# Patient Record
Sex: FEMALE | Race: WHITE | NOT HISPANIC OR LATINO | Employment: FULL TIME | URBAN - NONMETROPOLITAN AREA
[De-identification: names, ages, dates, MRNs, and addresses within clinical notes are randomized per-mention and may not be internally consistent; named-entity substitution may affect disease eponyms.]

---

## 2017-02-10 ENCOUNTER — HISTORY (OUTPATIENT)
Dept: FAMILY MEDICINE | Facility: OTHER | Age: 47
End: 2017-02-10

## 2017-02-10 ENCOUNTER — OFFICE VISIT - GICH (OUTPATIENT)
Dept: FAMILY MEDICINE | Facility: OTHER | Age: 47
End: 2017-02-10

## 2017-02-10 ENCOUNTER — HOSPITAL ENCOUNTER (OUTPATIENT)
Dept: RADIOLOGY | Facility: OTHER | Age: 47
End: 2017-02-10
Attending: FAMILY MEDICINE

## 2017-02-10 DIAGNOSIS — E11.9 TYPE 2 DIABETES MELLITUS WITHOUT COMPLICATIONS (H): ICD-10-CM

## 2017-02-10 DIAGNOSIS — Z23 ENCOUNTER FOR IMMUNIZATION: ICD-10-CM

## 2017-02-10 DIAGNOSIS — E11.65 TYPE 2 DIABETES MELLITUS WITH HYPERGLYCEMIA (H): ICD-10-CM

## 2017-02-10 DIAGNOSIS — N95.1 FEMALE CLIMACTERIC STATE: ICD-10-CM

## 2017-02-10 DIAGNOSIS — E78.00 PURE HYPERCHOLESTEROLEMIA: ICD-10-CM

## 2017-02-10 DIAGNOSIS — Z12.31 ENCOUNTER FOR SCREENING MAMMOGRAM FOR MALIGNANT NEOPLASM OF BREAST: ICD-10-CM

## 2017-02-10 DIAGNOSIS — Z79.4 LONG TERM CURRENT USE OF INSULIN (H): ICD-10-CM

## 2017-02-10 DIAGNOSIS — Z00.00 ENCOUNTER FOR GENERAL ADULT MEDICAL EXAMINATION WITHOUT ABNORMAL FINDINGS: ICD-10-CM

## 2017-02-10 LAB
ALB RAND URINE - HISTORICAL: <5 MG/L
ANION GAP - HISTORICAL: 10 (ref 5–18)
BUN SERPL-MCNC: 11 MG/DL (ref 7–25)
BUN/CREAT RATIO - HISTORICAL: 17
CALCIUM SERPL-MCNC: 9 MG/DL (ref 8.6–10.3)
CHLORIDE SERPLBLD-SCNC: 101 MMOL/L (ref 98–107)
CHOL/HDL RATIO - HISTORICAL: 2.67
CHOLESTEROL TOTAL: 144 MG/DL
CO2 SERPL-SCNC: 26 MMOL/L (ref 21–31)
CREAT SERPL-MCNC: 0.66 MG/DL (ref 0.7–1.3)
CREATININE, URINE - HISTORICAL: 0.58 G/L
ERYTHROCYTE [SEDIMENTATION RATE] IN BLOOD: 9 MM/HR
ESTIMATED AVERAGE GLUCOSE: 214 MG/DL
GFR IF NOT AFRICAN AMERICAN - HISTORICAL: >60 ML/MIN/1.73M2
GLUCOSE SERPL-MCNC: 163 MG/DL (ref 70–105)
HDLC SERPL-MCNC: 54 MG/DL (ref 23–92)
HEMOGLOBIN A1C MONITORING (POCT) - HISTORICAL: 9.1 % (ref 4–6.2)
LDLC SERPL CALC-MCNC: 67 MG/DL
MICROALBUMIN, RAND UR - HISTORICAL: NORMAL MG/G CREAT
NON-HDL CHOLESTEROL - HISTORICAL: 90 MG/DL
PATIENT STATUS - HISTORICAL: NORMAL
POTASSIUM SERPL-SCNC: 3.4 MMOL/L (ref 3.5–5.1)
PROTEIN QUALITATIVE,URINE - HISTORICAL: NEGATIVE MG/DL
SODIUM SERPL-SCNC: 137 MMOL/L (ref 133–143)
TRIGL SERPL-MCNC: 116 MG/DL

## 2017-04-07 ENCOUNTER — COMMUNICATION - GICH (OUTPATIENT)
Dept: FAMILY MEDICINE | Facility: OTHER | Age: 47
End: 2017-04-07

## 2017-04-07 DIAGNOSIS — E11.9 TYPE 2 DIABETES MELLITUS WITHOUT COMPLICATIONS (H): ICD-10-CM

## 2017-04-07 DIAGNOSIS — Z79.4 LONG TERM CURRENT USE OF INSULIN (H): ICD-10-CM

## 2018-01-03 NOTE — PROGRESS NOTES
Patient Information     Patient Name MRN Sex Selena Amaro [id number] Female 1970      Progress Notes by Irma Torres DO at 2/10/2017 11:00 AM     Author:  Irma Torres DO Service:  (none) Author Type:  PHYS- Osteopathic     Filed:  2017  6:17 AM Encounter Date:  2/10/2017 Status:  Signed     :  Irma Torres DO (PHYS- Osteopathic)            ANNUAL PHYSICAL - FEMALE    HPI: Selena Bingham is a 46 y.o. female who presents for a yearly exam.  Concerns include:  1. DM2.  Has not been watching what she is eating. Concerned her A1c is going to be higher than last time.  On metformin 1000mg; Lantus 40-48u daily.  Has gained 5# since last physical.  Monitors feet daily.  Has eye exam after today's appointment.  Insurance requiring change from Lantus to Basaglar.  2. HLP.  On Zocor 20mg daily.  Last cholesterol was one year ago and stable.  3. Perimenopausal symptoms.  Having hot flashes during the day and night sweats frequently.  Also irritable/emotional.  Still using OCPs for contraception.  Discussed stopping, and consider using Mirena if menorrhagia develops again.    No LMP recorded.   Contraception: OCPs; but is stopping them.  Risk for STI?: No  Last pap: 2016, normal  Any hx of abnormal paps:  Prior abnormal ~5-6 years ago; 3 normal since.  Resuming q3 year monitoring.  FH of early CA?: no  Cholesterol/DM concerns/screening: personal history  Tobacco?: no  Calcium intake: no  DEXA: Due @ 65  Last mammo: 2/10/2017   Colonoscopy: Due @ 50  Immunizations: Tdap due (2007); Hep B #3/3 due; Pneumovax-23 due.  Received influenza vaccine for 9740-3765.     Completing eye exam today after this appointment.    Patient Active Problem List       Diagnosis  Date Noted     Hypercholesterolemia  2014     ABNORMAL PAP SMEAR  2011     endometrial cells on otherwise negative pap smear in woman age 40 or over          BREAST MASS, RIGHT       enlarged          DIABETES MELLITUS,  "TYPE II       ANXIETY  03/09/2009       Past Medical History      Diagnosis   Date     Diabetes type 2, controlled (HC)       UTI (urinary tract infection)       UTI 12/8/12      Varicella       Chickenpox        Past Surgical History      Procedure  Laterality Date     No previous surgery         Social History     Social History        Marital status:  Single     Spouse name: N/A     Number of children:  N/A     Years of education:  N/A     Occupational History      Not on file.     Social History Main Topics        Smoking status:  Never Smoker     Smokeless tobacco:  Never Used     Alcohol use  No     Drug use:  No     Sexual activity:  Not Currently     Partners: Male     Birth control/ protection: Pill     Other Topics  Concern     Not on file      Social History Narrative     Works as a  \"\" @ [employer].  She is not .  + Boyfriend.       Family History       Problem   Relation Age of Onset     Diabetes  Mother      well controlled       Hypertension  Mother      Unknown  Father      Good Health  Brother      unsure       Cancer-breast  No Family History        Current Outpatient Prescriptions       Medication  Sig Dispense Refill     blood sugar diagnostic (ACCU-CHEK SIERRA PLUS TEST STRP) strip Test 3 times daily E11.9 300 Strip 0     fluticasone (50 mcg per actuation) nasal solution (FLONASE) Inhale 1 Spray into both nostrils once daily. 1 Bottle 5     insulin glargine (BASAGLAR KWIKPEN) 100 unit/mL (3 mL) pen Inject 48 Units subcutaneous before bedtime. Product desired:BASAGLAR 2 box 11     Insulin Needles, Disposable, (BD INSULIN PEN NEEDLE UF MINI) 31 gauge x 3/16\" Once daily as directed E11.9 100 Each 0     lancets (ACCU-CHEK MULTICLIX LANCET) Test 3 times daily E11.9 300 Each 0     metFORMIN (GLUCOPHAGE) 1,000 mg tablet Take 1 tablet by mouth 2 times daily with meals. 180 tablet 2     multivitamin (MVI) tablet Take 1 tablet by mouth once daily.       simvastatin (ZOCOR) " "20 mg tablet Take 1 tablet by mouth at bedtime. 90 tablet 4     traZODone (DESYREL) 50 mg tablet TAKE 1 TABLET BY MOUTH AT BEDTIME IF NEEDED FOR SLEEP 90 tablet 0     No current facility-administered medications for this visit.      Medications have been reviewed by me and are current to the best of my knowledge and ability.       REVIEW OF SYSTEMS:  Refer to HPI; other systems reviewed and negative.    PHYSICAL EXAM:  Visit Vitals       /80     Pulse 68     Ht 1.619 m (5' 3.75\")     Wt 85.7 kg (189 lb)     BMI 32.7 kg/m2     CONSTITUTIONAL:  Alert, cooperative, NAD.  EYES: No scleral icterus.  PERRLA.  Conjunctiva clear.  ENT/MOUTH: External ears and nose normal.  TMs normal.  Moist mucous membranes. Oropharynx clear.    ENDO: No thyromegaly or thyroid nodules.  LYMPH:  No cervical or supraclavicular LA.    BREASTS: No skin abnormalities, no erythema.  No discrete masses.  No nipple discharge, no axillary, supra- or infraclavicular LA.   CARDIOVASCULAR: Regular, S1, S2.  No S3 or S4.  No murmur/gallop/rub.  No peripheral edema.  RESPIRATORY: CTA bilaterally, no wheezes, rhonchi or rales.  GI: Bowel sounds wnl.  Soft, nontender, nondistended.  No masses or HSM.  No rebound or guarding.  : Vulva: normal, no lesions or discharge  Urethral meatus: normal size and location, no lesions or discharge  Urethra: no tenderness or masses  Bladder: no fullness or tenderness  Vagina: normal appearance, no abnormal discharge, no lesions.  No evidence of cystocele or rectocele.  Cervix: no cervical motion tenderness  Uterus: normal size and position, mobile, non-tender  Adnexa: no palpable masses bilaterally. No cervical motion tenderness.  Pap smear obtained: no; bimanual exam completed only.  MSKEL: Grossly normal ROM.  No clubbing.  INTEGUMENTARY:  Warm, dry.  No rash noted on exposed skin.  NEUROLOGIC: Facies symmetric.  Grossly normal movement and tone.  No tremor.  PSYCHIATRIC: Affect normal.  Speech fluent.      PHQ " Depression Screening 9/17/2015 2/9/2016   Date of PHQ exam (doc flow) 9/17/2015 2/9/2016   1. Lack of interest/pleasure 0 - Not at all 0 - Not at all   2. Feeling down/depressed 0 - Not at all 0 - Not at all   PHQ-2 TOTAL SCORE 0 0   3. Trouble sleeping 1 - Several days 1 - Several days   4. Decreased energy 0 - Not at all 0 - Not at all   5. Appetite change 0 - Not at all 0 - Not at all   6. Feelings of failure 0 - Not at all 0 - Not at all   7. Trouble concentrating 0 - Not at all 0 - Not at all   8. Activity level 0 - Not at all 0 - Not at all   9. Hurting yourself 0 - Not at all 0 - Not at all   PHQ-9 TOTAL SCORE 1 1   PHQ-9 Severity Level none none   Functional Impairment somewhat difficult somewhat difficult       Results for orders placed or performed in visit on 02/10/17      SEDIMENTATION RATE      Result  Value Ref Range    SEDIMENTATION RATE        9 <21 mm/hr   HEMOGLOBIN A1C MONITORING (POCT)      Result  Value Ref Range    HEMOGLOBIN A1C MONITORING (POCT) 9.1 (H) 4.0 - 6.2 %    ESTIMATED AVERAGE GLUCOSE  214 mg/dL   BASIC METABOLIC PANEL      Result  Value Ref Range    SODIUM 137 133 - 143 mmol/L    POTASSIUM 3.4 (L) 3.5 - 5.1 mmol/L    CHLORIDE 101 98 - 107 mmol/L    CO2,TOTAL 26 21 - 31 mmol/L    ANION GAP 10 5 - 18                    GLUCOSE 163 (H) 70 - 105 mg/dL    CALCIUM 9.0 8.6 - 10.3 mg/dL    BUN 11 7 - 25 mg/dL    CREATININE 0.66 (L) 0.70 - 1.30 mg/dL    BUN/CREAT RATIO           17                    GFR if African American >60 >60 ml/min/1.73m2    GFR if not African American >60 >60 ml/min/1.73m2   LIPID PANEL      Result  Value Ref Range    CHOLESTEROL,TOTAL 144 <200 mg/dL    TRIGLYCERIDES 116 <150 mg/dL    HDL CHOLESTEROL 54 23 - 92 mg/dL    NON-HDL CHOLESTEROL 90 <145 mg/dl    CHOL/HDL RATIO            2.67 <4.50                    LDL CHOLESTEROL 67 <100 mg/dL    PATIENT STATUS            NOT GIVEN                   MICROALBUMIN RANDOM URINE      Result  Value Ref Range    ALB RAND  URINE            <5.0 mg/L    CREATININE,URINE          0.58 g/L    MICROALBUMIN,RAND UR       <30.0 mg/g creat    PROTEIN, URINE Negative Negative mg/dL       ASSESSMENT/PLAN:    ICD-10-CM    1. Annual physical exam Z00.00    2. DIABETES MELLITUS, TYPE II E11.9    3. Perimenopausal symptoms N95.1 SEDIMENTATION RATE      SEDIMENTATION RATE   4. Hypercholesterolemia E78.00 simvastatin (ZOCOR) 20 mg tablet   5. Controlled type 2 diabetes mellitus with hyperglycemia, with long-term current use of insulin (HC) E11.65 HEMOGLOBIN A1C MONITORING (POCT)     Z79.4 BASIC METABOLIC PANEL      insulin glargine (BASAGLAR KWIKPEN) 100 unit/mL (3 mL) pen      metFORMIN (GLUCOPHAGE) 1,000 mg tablet      LIPID PANEL      MICROALBUMIN RANDOM URINE      HEMOGLOBIN A1C MONITORING (POCT)      BASIC METABOLIC PANEL      LIPID PANEL      MICROALBUMIN RANDOM URINE   6. Need for hepatitis B vaccination Z23 HEP B VACCINE ADULT IM      NE ADMIN VACC INITIAL   7. Need for pneumococcal vaccine Z23 PNEUMOCOCCAL VACCINE 23-VALENT => 1 YO IM      NE ADMIN EA ADDL VACC   8. Need for Tdap vaccination Z23 TDAP VACCINE IM      NE ADMIN EA ADDL VACC     Relevant cancer screening discussed.    Counseled on healthy diet, Calcium and vitamin D intake, and exercise.    DM, type 2, uncontrolled last visit.  Due for monitoring labs as above.  Will Rx Basaglar in exchange for Lantus.  Encouraged ASA daily, and will get eye exam done later today.  No elevated BP; but will consider Lisinopril if worsens at all.  Will update Pneumococcal and Hep B vaccines as due.    HLP, chronic.  Due for lipids.    Perimenopausal, new: D/C OCPs.  If menorrhagia develops - will consider IUD.  Discussed ASA daily may help with vasomotor symptoms; but will consider Wellbutrin or other SSRI to help.  Follow up in 2-3 months.    TING BARROW, DO

## 2018-01-03 NOTE — PROGRESS NOTES
Patient Information     Patient Name MRN Sex Selena Amaro [id number] Female 1970      Progress Notes by Sherin Quigley R.T. (ARRT) at 2/10/2017 10:07 AM     Author:  Sherin Quigley R.T. (ARRT) Service:  (none) Author Type:  (none)     Filed:  2/10/2017 10:07 AM Date of Service:  2/10/2017 10:07 AM Status:  Signed     :  Sherin Quigley R.T. (RODNEYT) ([facility] - Registered Radiologic Technologist)            Falls Risk Criteria:    Age 65 and older or under age 4        Sensory deficits    Poor vision    Use of ambulatory aides    Impaired judgment    Unable to walk independently    Meets High Risk criteria for falls:  no

## 2018-01-04 NOTE — TELEPHONE ENCOUNTER
Patient Information     Patient Name MRN Sex Selena Amaro [id number] Female 1970      Telephone Encounter by Maryan Lyn RN at 2017 10:05 AM     Author:  Maryan Lyn RN Service:  (none) Author Type:  NURS- Registered Nurse     Filed:  2017 10:16 AM Encounter Date:  2017 Status:  Signed     :  Maryan Lyn RN (NURS- Registered Nurse)            Diabetic Supplies  Office visit in the past 12 months.  Last visit with TING BARROW was on: 02/10/2017 in [facility]  Next visit with TING BARROW is on: No future appointment listed with this provider  Next visit with Family Practice is on: No future appointment listed in this department  Max refill for 12 months from last office visit.  Always add ICD-9 code.  Needs not be listed on Med List to fill.  Need new RX every 12 months or if exceeds the limitations set by Medicare, then every 6 months.  Also when testing frequency is changed is there a need to obtain a new order.  A refill request does not need to be approved by the ordering physician-a beneficiary or their caregiver may request refills.  Physicians are not required to fill out additional forms such as home testing results for suppliers or provide additional documentation unless the supplier is audited and the  is requesting such documentation.    Prescription refilled per RN Medication Refill Policy.................... Maryan Lyn RN ....................  2017   10:06 AM

## 2018-01-27 VITALS
BODY MASS INDEX: 32.27 KG/M2 | HEART RATE: 68 BPM | WEIGHT: 189 LBS | SYSTOLIC BLOOD PRESSURE: 132 MMHG | DIASTOLIC BLOOD PRESSURE: 80 MMHG | HEIGHT: 64 IN

## 2018-02-09 ENCOUNTER — DOCUMENTATION ONLY (OUTPATIENT)
Dept: FAMILY MEDICINE | Facility: OTHER | Age: 48
End: 2018-02-09

## 2018-02-09 PROBLEM — E11.9 DIABETES MELLITUS, TYPE II (H): Status: ACTIVE | Noted: 2018-02-09

## 2018-02-09 PROBLEM — N63.10 BREAST MASS, RIGHT: Status: ACTIVE | Noted: 2018-02-09

## 2018-02-09 RX ORDER — SIMVASTATIN 20 MG
20 TABLET ORAL AT BEDTIME
COMMUNITY
Start: 2017-02-10 | End: 2018-03-21

## 2018-02-09 RX ORDER — FLUTICASONE PROPIONATE 50 MCG
1 SPRAY, SUSPENSION (ML) NASAL DAILY
COMMUNITY
Start: 2013-09-20 | End: 2019-06-20

## 2018-02-09 RX ORDER — DIPHENOXYLATE HYDROCHLORIDE AND ATROPINE SULFATE 2.5; .025 MG/1; MG/1
1 TABLET ORAL DAILY
COMMUNITY

## 2018-02-09 RX ORDER — TRAZODONE HYDROCHLORIDE 50 MG/1
50 TABLET, FILM COATED ORAL AT BEDTIME
COMMUNITY
Start: 2015-01-29 | End: 2019-06-20

## 2018-02-22 NOTE — LETTER
February 26, 2018      Selena ALMARAZ Otilia  [address] [address]        This is to remind you that you are due for your annual labs and an office visit with Irma Torres DO.  Your last visit was on 02/10/2017.     Additional refills of your medication require you to complete this visit.    Please call [phone] to schedule your appointment.    Thank you for choosing [facility] for your health careneeds.    Sincerely,      Refill RN  [facility]

## 2018-03-21 DIAGNOSIS — E78.00 HYPERCHOLESTEREMIA: ICD-10-CM

## 2018-03-21 DIAGNOSIS — E11.65 CONTROLLED TYPE 2 DIABETES MELLITUS WITH HYPERGLYCEMIA, WITHOUT LONG-TERM CURRENT USE OF INSULIN (H): Primary | ICD-10-CM

## 2018-03-27 RX ORDER — INSULIN GLARGINE 100 [IU]/ML
INJECTION, SOLUTION SUBCUTANEOUS
Qty: 30 ML | Refills: 0 | Status: SHIPPED | OUTPATIENT
Start: 2018-03-27 | End: 2018-04-11

## 2018-03-27 RX ORDER — SIMVASTATIN 20 MG
TABLET ORAL
Qty: 90 TABLET | Refills: 0 | Status: SHIPPED | OUTPATIENT
Start: 2018-03-27 | End: 2018-07-12

## 2018-04-11 ENCOUNTER — OFFICE VISIT (OUTPATIENT)
Dept: FAMILY MEDICINE | Facility: OTHER | Age: 48
End: 2018-04-11
Attending: FAMILY MEDICINE
Payer: COMMERCIAL

## 2018-04-11 ENCOUNTER — HOSPITAL ENCOUNTER (OUTPATIENT)
Dept: MAMMOGRAPHY | Facility: OTHER | Age: 48
Discharge: HOME | End: 2018-04-11
Attending: FAMILY MEDICINE | Admitting: FAMILY MEDICINE
Payer: COMMERCIAL

## 2018-04-11 VITALS
DIASTOLIC BLOOD PRESSURE: 80 MMHG | SYSTOLIC BLOOD PRESSURE: 122 MMHG | WEIGHT: 186.8 LBS | HEART RATE: 72 BPM | BODY MASS INDEX: 31.89 KG/M2 | HEIGHT: 64 IN

## 2018-04-11 DIAGNOSIS — E78.00 HYPERCHOLESTEROLEMIA: ICD-10-CM

## 2018-04-11 DIAGNOSIS — R11.0 NAUSEA: ICD-10-CM

## 2018-04-11 DIAGNOSIS — Z12.31 VISIT FOR SCREENING MAMMOGRAM: ICD-10-CM

## 2018-04-11 DIAGNOSIS — Z79.4 UNCONTROLLED TYPE 2 DIABETES MELLITUS WITH HYPERGLYCEMIA, WITH LONG-TERM CURRENT USE OF INSULIN (H): ICD-10-CM

## 2018-04-11 DIAGNOSIS — E11.65 UNCONTROLLED TYPE 2 DIABETES MELLITUS WITH HYPERGLYCEMIA, WITH LONG-TERM CURRENT USE OF INSULIN (H): ICD-10-CM

## 2018-04-11 DIAGNOSIS — F41.1 ANXIETY STATE: ICD-10-CM

## 2018-04-11 DIAGNOSIS — Z00.00 ROUTINE HISTORY AND PHYSICAL EXAMINATION OF ADULT: Primary | ICD-10-CM

## 2018-04-11 LAB
ANION GAP SERPL CALCULATED.3IONS-SCNC: 10 MMOL/L (ref 3–14)
BUN SERPL-MCNC: 19 MG/DL (ref 7–25)
CALCIUM SERPL-MCNC: 10.3 MG/DL (ref 8.6–10.3)
CHLORIDE SERPL-SCNC: 100 MMOL/L (ref 98–107)
CHOLEST SERPL-MCNC: 135 MG/DL
CO2 SERPL-SCNC: 30 MMOL/L (ref 21–31)
CREAT SERPL-MCNC: 0.75 MG/DL (ref 0.6–1.2)
CREAT UR-MCNC: 110 MG/DL
GFR SERPL CREATININE-BSD FRML MDRD: 83 ML/MIN/1.7M2
GLUCOSE SERPL-MCNC: 208 MG/DL (ref 70–105)
HBA1C MFR BLD: 9.1 % (ref 4–6)
HDLC SERPL-MCNC: 63 MG/DL (ref 23–92)
LDLC SERPL CALC-MCNC: 56 MG/DL
MICROALBUMIN UR-MCNC: 6 MG/L
MICROALBUMIN/CREAT UR: 5.72 MG/G CR (ref 0–25)
NONHDLC SERPL-MCNC: 72 MG/DL
POTASSIUM SERPL-SCNC: 3.8 MMOL/L (ref 3.5–5.1)
SODIUM SERPL-SCNC: 140 MMOL/L (ref 134–144)
TRIGL SERPL-MCNC: 82 MG/DL

## 2018-04-11 PROCEDURE — 80048 BASIC METABOLIC PNL TOTAL CA: CPT | Performed by: FAMILY MEDICINE

## 2018-04-11 PROCEDURE — 80061 LIPID PANEL: CPT | Performed by: FAMILY MEDICINE

## 2018-04-11 PROCEDURE — 82043 UR ALBUMIN QUANTITATIVE: CPT | Performed by: FAMILY MEDICINE

## 2018-04-11 PROCEDURE — 99396 PREV VISIT EST AGE 40-64: CPT | Performed by: FAMILY MEDICINE

## 2018-04-11 PROCEDURE — 36415 COLL VENOUS BLD VENIPUNCTURE: CPT | Performed by: FAMILY MEDICINE

## 2018-04-11 PROCEDURE — 83036 HEMOGLOBIN GLYCOSYLATED A1C: CPT | Performed by: FAMILY MEDICINE

## 2018-04-11 PROCEDURE — 77067 SCR MAMMO BI INCL CAD: CPT

## 2018-04-11 RX ORDER — INSULIN GLARGINE 100 [IU]/ML
60 INJECTION, SOLUTION SUBCUTANEOUS AT BEDTIME
Qty: 63 ML | Refills: 4 | Status: SHIPPED | OUTPATIENT
Start: 2018-04-11 | End: 2019-06-20

## 2018-04-11 RX ORDER — GLIMEPIRIDE 4 MG/1
4 TABLET ORAL
Qty: 90 TABLET | Refills: 4 | Status: SHIPPED | OUTPATIENT
Start: 2018-04-11 | End: 2018-05-01

## 2018-04-11 NOTE — MR AVS SNAPSHOT
"              After Visit Summary   4/11/2018    Selena Bingham    MRN: [id number]           Patient Information     Date Of Birth          1970        Visit Information        Provider Department      4/11/2018 10:00 AM Irma Torres DO [facility]        Today's Diagnoses     Routine history and physical examination of adult    -  1    Type 2 diabetes mellitus without complication, with long-term current use of insulin (H)        Hypercholesterolemia        Anxiety state        Controlled type 2 diabetes mellitus with hyperglycemia, without long-term current use of insulin (H)           Follow-ups after your visit        Who to contact     If you have questions or need follow up information about today's clinic visit or your schedule please contact [facility] directly at [phone].  Normal or non-critical lab and imaging results will be communicated to you by [employer], letter or phone within 4 business days after the clinic has received the results. If you do not hear from us within 7 days, please contact the clinic through [employer] or phone. If you have a critical or abnormal lab result, we will notify you by phone as soon as possible.  Submit refill requests through [employer] or call your pharmacy and they will forward the refill request to us. Please allow 3 business days for your refill to be completed.          Additional Information About Your Visit        [employer] Information     [employer] lets you send messages to your doctor, view your test results, renew your prescriptions, schedule appointments and more. To sign up, go to [email or web address] . Click on \"Log in\" on the left side of the screen, which will take you to the Welcome page. Then click on \"Sign up Now\" on the right side of the page.     You will be asked to enter the access code listed below, as well as some personal information. Please follow the directions to create your username and " "password.     Your access code is: [email or web address]  Expires: 7/10/2018 10:27 AM     Your access code will  in 90 days. If you need help or a new code, please call your [place] clinic or [phone].        Care EveryWhere ID     This is your Care EveryWhere ID. This could be used by other organizations to access your [place] medical records  [id number]        Your Vitals Were     Pulse Height Last Period BMI (Body Mass Index)          72 5' 3.5\" (1.613 m) 2018 32.57 kg/m2         Blood Pressure from Last 3 Encounters:   18 122/80   02/10/17 132/80   16 122/88    Weight from Last 3 Encounters:   18 186 lb 12.8 oz (84.7 kg)   02/10/17 189 lb (85.7 kg)   16 184 lb 6.4 oz (83.6 kg)              We Performed the Following     Albumin Random Urine Quantitative with Creat Ratio     Basic metabolic panel  (Ca, Cl, CO2, Creat, Gluc, K, Na, BUN)     Hemoglobin A1c     Lipid Profile (Chol, Trig, HDL, LDL calc) - FASTING          Today's Medication Changes          These changes are accurate as of 18 10:27 AM.  If you have any questions, ask your nurse or doctor.               These medicines have changed or have updated prescriptions.        Dose/Directions    BASAGLAR 100 UNIT/ML injection   This may have changed:  See the new instructions.   Used for:  Controlled type 2 diabetes mellitus with hyperglycemia, without long-term current use of insulin (H)   Changed by:  Irma Torres DO        Dose:  60 Units   Inject 60 Units Subcutaneous At Bedtime   Quantity:  63 mL   Refills:  4            Where to get your medicines      These medications were sent to [id number] - [place], [place] - [address], [facility] [address]     Phone:  [phone]     BASAGLAR 100 UNIT/ML injection                Primary Care Provider Office Phone # Fax #    Irma Torres [phone] [address]  [place] [address]        Equal Access to Services     [facility] HospitalDANIELLA : Hadii aad jeff taylor Luciano, wasereneda luqadaha, qarhiannonta kaalmaenrique rodriguez, radha higginsdorotajordan hoffman. So [facility] [phone].    ATENCIÓN: Si habla español, tiene a gilmore disposición servicios gratuitos de asistencia lingüística. Llame al [phone].    We comply with applicable federal civil rights laws and [place] laws. We do not discriminate on the basis of race, color, national origin, age, disability, sex, sexual orientation, or gender identity.            Thank you!     Thank you for choosing [facility]  for your care. Our goal is always to provide you with excellent care. Hearing back from our patients is one way we can continue to improve our services. Please take a few minutes to complete the written survey that you may receive in the mail after your visit with us. Thank you!             Your Updated Medication List - Protect others around you: Learn how to safely use, store and throw away your medicines at www.disposemymeds.org.          This list is accurate as of 4/11/18 10:27 AM.  Always use your most recent med list.                   Brand Name Dispense Instructions for use Diagnosis    ACCU-CHEK ACTIVE STRIPS test strip   Generic drug:  blood glucose monitoring      3 times daily        ASPIRIN LOW DOSE 81 MG tablet   Generic drug:  aspirin     30 tablet    Take by mouth daily        BASAGLAR 100 UNIT/ML injection     63 mL    Inject 60 Units Subcutaneous At Bedtime    Controlled type 2 diabetes mellitus with hyperglycemia, without long-term current use of insulin (H)       blood glucose monitoring lancets      Dispense item per insurance coverage E11.9 IDDM Type 2- Test 3 times a day        FIFTY50 PEN NEEDLES 31G X 5 MM   Generic drug:  insulin pen needle      Dispense item per insurance coverage- Use as directed for administering insulin at home. E11.9 IDDM Type 2        fluticasone 50 MCG/ACT  spray    FLONASE     Spray 1 spray in nostril daily        metFORMIN 1000 MG tablet    GLUCOPHAGE    180 tablet    TAKE 1 TABLET BY MOUTH TWICE DAILY WITH MEALS    Type 2 diabetes mellitus, uncontrolled (H)       MULTI-VITAMINS Tabs      Take 1 tablet by mouth daily        simvastatin 20 MG tablet    ZOCOR    90 tablet    TAKE 1 TABLET BY MOUTH AT BEDTIME    Hypercholesteremia       traZODone 50 MG tablet    DESYREL     Take 50 mg by mouth At Bedtime

## 2018-04-11 NOTE — NURSING NOTE
Previous A1C is not at goal of <8  No components found for: HGBA1C  Urine microalbumin:creatine: <30.0  Foot exam unknown  Eye exam: today    Tobacco User no  Patient is on a daily aspirin  Patient is on a Statin.  Blood pressure today of 122/80 is at the goal of <139/89 for diabetics.    Татьяна Mchugh LPN..............4/11/2018 11:20 AM

## 2018-04-11 NOTE — PROGRESS NOTES
Nursing Notes:   Татьяна Mchugh LPN  4/11/2018  9:56 AM  Signed  Patient here for physical. No concerns.  Татьяна REINA Severino............................... 4/11/2018 9:53 AM      ANNUAL PHYSICAL - FEMALE    HPI: Selena presents for a yearly exam.  Concerns include:  1. DM.  Most blood sugars are mid-100s.  Rare over 200 per patient report.  No log available for review.  Has previously had uncontrolled Hgb A1c levels.  Currently on metformin 1000mg BID and Basaglar insulin 60 units at bedtime.  2. Did have an episode in Jan where she got up during the night to use the bathroom, and when she went from laying to standing, she became severely sweaty, nauseous.  She proceeded to the bathroom, and when she was done, proceeded to stand and have it occur again.  No further episodes.  No changes with increased exertion.  Denies current CP, SOB, N/V, vision changes, headaches.    Patient's last menstrual period was 04/01/2018.   Contraception: none; aware of risk of pregnancy.  Risk for STI?: No  Last pap: 2/9/2016; negative.   Any hx of abnormal paps:  LSIL in 6/2009 with multiple negative testing since.  FH ofearly CA?: No  Cholesterol/DM concerns/screening: Due  Tobacco?: No  Calcium intake: multivitamin and dietary; no extra Ca++.  DEXA: @ 65  Last mammo: 4/11/2018; results pending.  Colonoscopy: @ 50.  Immunizations: Tdap 2/10/2017; no flu vaccine this season; shingles @ 50.  Recommended to get PSV 23 today due to DM diagnosis.    Patient Active Problem List   Diagnosis     Other abnormal Papanicolaou smear of cervix and cervical HPV(795.09)     Anxiety state     Breast mass, right     Diabetes mellitus, type II (H)     Hypercholesterolemia     Past Medical History:   Diagnosis Date     Type 2 diabetes mellitus without complications (H)     No Comments Provided     Urinary tract infection     UTI 12/8/12     Varicella without complication     Chickenpox     Past Surgical History:   Procedure Laterality Date      "OTHER SURGICAL HISTORY      [id number],NO PREVIOUS SURGERY     Family History   Problem Relation Age of Onset     DIABETES Mother      Diabetes,well controlled     Hypertension Mother      Hypertension     Unknown/Adopted Father      Unknown     Family History Negative Brother      Good Health,unsure     Breast Cancer No family hx of      Cancer-breast     Social History     Social History     Marital status: Single     Spouse name: N/A     Number of children: N/A     Years of education: N/A     Social History Main Topics     Smoking status: Never Smoker     Smokeless tobacco: Never Used     Alcohol use No     Drug use: No      Comment: Drug use: No     Sexual activity: Not Currently     Partners: Male     Birth control/ protection: Pill     Other Topics Concern     None     Social History Narrative    Works as a  \"\" @ [employer].  She is not .  + Boyfriend.       Current Outpatient Prescriptions   Medication Sig Dispense Refill     aspirin (ASPIRIN LOW DOSE) 81 MG tablet Take by mouth daily 30 tablet      BASAGLAR 100 UNIT/ML injection Inject 60 Units Subcutaneous At Bedtime 63 mL 4     simvastatin (ZOCOR) 20 MG tablet TAKE 1 TABLET BY MOUTH AT BEDTIME 90 tablet 0     [DISCONTINUED] BASAGLAR 100 UNIT/ML injection INJECT 48 UNITS UNDER THE SKIN BEFORE BEDTIME 30 mL 0     metFORMIN (GLUCOPHAGE) 1000 MG tablet TAKE 1 TABLET BY MOUTH TWICE DAILY WITH MEALS 180 tablet 0     blood glucose monitoring (ACCU-CHEK ACTIVE STRIPS) test strip 3 times daily       fluticasone (FLONASE) 50 MCG/ACT spray Spray 1 spray in nostril daily       insulin pen needle (FIFTY50 PEN NEEDLES) 31G X 5 MM Dispense item per insurance coverage- Use as directed for administering insulin at home. E11.9 IDDM Type 2       blood glucose monitoring (ACCU-CHEK MULTICLIX) lancets Dispense item per insurance coverage E11.9 IDDM Type 2- Test 3 times a day       Multiple Vitamin (MULTI-VITAMINS) TABS Take 1 tablet by mouth daily   " "    traZODone (DESYREL) 50 MG tablet Take 50 mg by mouth At Bedtime          REVIEW OF SYSTEMS:  Refer to HPI; all other systems reviewed and negative.    PHYSICAL EXAM:  /80 (BP Location: Right arm, Patient Position: Sitting, Cuff Size: Adult Regular)  Pulse 72  Ht 5' 3.5\" (1.613 m)  Wt 186 lb 12.8 oz (84.7 kg)  LMP 04/01/2018  BMI 32.57 kg/m2  CONSTITUTIONAL:  Alert, cooperative, NAD.  EYES: No scleral icterus.  PERRLA.  Conjunctiva clear.  ENT/MOUTH: External ears and nosenormal.  TMs normal.  Moist mucous membranes. Oropharynx clear.    ENDO: No thyromegaly or thyroid nodules.  LYMPH:  No cervical or supraclavicular LA.    BREASTS: Deferred  CARDIOVASCULAR: Regular, S1, S2.  No S3 or S4.  No murmur/gallop/rub.  No peripheral edema.  RESPIRATORY: CTA bilaterally, no wheezes, rhonchior rales.  GI: Bowel sounds wnl.  Soft, nontender, nondistended.  No masses or HSM.  No rebound or guarding.  : Deferred  Pap smear obtained: No  MSKEL: Grossly normal ROM.  No clubbing.  INTEGUMENTARY:Warm, dry.  No rash noted on exposed skin.  Foot exam: normal b/l.  Sensation intact.  Small dryness/mild callous at heel and 5th toes plantar aspect.  NEUROLOGIC: Facies symmetric.  Grossly normal movement and tone.  No tremor.  PSYCHIATRIC: Affect normal.  Speech fluent.      PHQ-9 SCORE 9/17/2015 2/9/2016   Total Score 1 1       Results for orders placed or performed in visit on 04/11/18   Basic metabolic panel  (Ca, Cl, CO2, Creat, Gluc, K, Na, BUN)   Result Value Ref Range    Sodium 140 134 - 144 mmol/L    Potassium 3.8 3.5 - 5.1 mmol/L    Chloride 100 98 - 107 mmol/L    Carbon Dioxide 30 21 - 31 mmol/L    Anion Gap 10 3 - 14 mmol/L    Glucose 208 (H) 70 - 105 mg/dL    Urea Nitrogen 19 7 - 25 mg/dL    Creatinine 0.75 0.60 - 1.20 mg/dL    GFR Estimate 83 >60 mL/min/1.7m2    GFR Estimate If Black >90 >60 mL/min/1.7m2    Calcium 10.3 8.6 - 10.3 mg/dL   Hemoglobin A1c   Result Value Ref Range    Hemoglobin A1C 9.1 (H) 4.0 - " 6.0 %   Lipid Profile (Chol, Trig, HDL, LDL calc) - FASTING   Result Value Ref Range    Cholesterol 135 <200 mg/dL    Triglycerides 82 <150 mg/dL    HDL Cholesterol 63 23 - 92 mg/dL    LDL Cholesterol Calculated 56 <100 mg/dL    Non HDL Cholesterol 72 <130 mg/dL       ASSESSMENT/PLAN:  1. Type 2 diabetes mellitus without complication, with long-term current use of insulin (H)  Uncontrolled.  Will need addition of another medication due to elevated Hgb A1c.  Insulin vs sulfonylurea vs other oral agent.  Will start glimepiride daily x 3 months.  If Hgb A1c remains elevated, will continue to discuss multi-dose insulin.  - Basic metabolic panel  (Ca, Cl, CO2, Creat, Gluc, K, Na, BUN)  - Hemoglobin A1c  - Albumin Random Urine Quantitative with Creat Ratio    2. Hypercholesterolemia  Improved with Zocor; continue to monitor for lipid screening yearly.  - Lipid Profile (Chol, Trig, HDL, LDL calc) - FASTING    3. Anxiety state  Chronic, stable. No meidcation indicated at this time.  Encouraged healthy activity, diet and improving blood sugar levels.    4. Routine history and physical examination of adult    5.  Nausea  Possible episode of orthostatic hypotension due to history.  No physical findings or concerns on today's exam.  Discussed if recurs or symptoms worsen, consider stress testing or further work up.    Relevant cancer screening discussed.    Counseled on healthy diet, Calcium and vitamin D intake, and exercise.    Irma Torres

## 2018-04-11 NOTE — NURSING NOTE
Patient here for physical. No concerns.  Татьяна Severino............................... 4/11/2018 9:53 AM

## 2018-04-23 ENCOUNTER — OFFICE VISIT (OUTPATIENT)
Dept: FAMILY MEDICINE | Facility: OTHER | Age: 48
End: 2018-04-23
Attending: NURSE PRACTITIONER
Payer: COMMERCIAL

## 2018-04-23 VITALS
WEIGHT: 183.38 LBS | DIASTOLIC BLOOD PRESSURE: 82 MMHG | TEMPERATURE: 97.3 F | SYSTOLIC BLOOD PRESSURE: 124 MMHG | HEART RATE: 80 BPM | BODY MASS INDEX: 31.97 KG/M2

## 2018-04-23 DIAGNOSIS — B08.4 HAND, FOOT AND MOUTH DISEASE: Primary | ICD-10-CM

## 2018-04-23 PROCEDURE — 99213 OFFICE O/P EST LOW 20 MIN: CPT | Performed by: NURSE PRACTITIONER

## 2018-04-23 ASSESSMENT — PAIN SCALES - GENERAL: PAINLEVEL: NO PAIN (0)

## 2018-04-23 NOTE — NURSING NOTE
Patient presents to clinic today for rash on palm of hands. She states it started about two days ago. She states it is not itchy, but some of the spots are painful.    Cecilia Guadarrama LPN...................4/23/2018  3:45 PM

## 2018-04-23 NOTE — PROGRESS NOTES
HPI:    Selena Bingham is a 47 year old female who presents to clinic today for rash. She has rash on palms of hands, present for about 2 days. Denies this being itchy but has some pain to some of the spots. Started on right hand with a couple spots. She reports this has spread to both hands. Has 1 lesion on her right heel. Feels her feet are pins and needles feeling. Rash is not on anywhere else on the body. Thinks maybe 1 lesion in her mouth. No known ill contacts. She does work in retail.     Past Medical History:   Diagnosis Date     Type 2 diabetes mellitus without complications (H)     No Comments Provided     Urinary tract infection     UTI 12/8/12     Varicella without complication     Chickenpox       Past Surgical History:   Procedure Laterality Date     OTHER SURGICAL HISTORY      [id number],NO PREVIOUS SURGERY       Current Outpatient Prescriptions   Medication Sig Dispense Refill     aspirin (ASPIRIN LOW DOSE) 81 MG tablet Take by mouth daily 30 tablet      BASAGLAR 100 UNIT/ML injection Inject 60 Units Subcutaneous At Bedtime 63 mL 4     blood glucose monitoring (ACCU-CHEK ACTIVE STRIPS) test strip 3 times daily       blood glucose monitoring (ACCU-CHEK MULTICLIX) lancets Dispense item per insurance coverage E11.9 IDDM Type 2- Test 3 times a day       fluticasone (FLONASE) 50 MCG/ACT spray Spray 1 spray in nostril daily       glimepiride (AMARYL) 4 MG tablet Take 1 tablet (4 mg) by mouth every morning (before breakfast) 90 tablet 4     insulin pen needle (FIFTY50 PEN NEEDLES) 31G X 5 MM Dispense item per insurance coverage- Use as directed for administering insulin at home. E11.9 IDDM Type 2       metFORMIN (GLUCOPHAGE) 1000 MG tablet TAKE 1 TABLET BY MOUTH TWICE DAILY WITH MEALS 180 tablet 0     Multiple Vitamin (MULTI-VITAMINS) TABS Take 1 tablet by mouth daily       simvastatin (ZOCOR) 20 MG tablet TAKE 1 TABLET BY MOUTH AT BEDTIME 90 tablet 0     traZODone (DESYREL) 50 MG tablet Take 50 mg by mouth At  Bedtime         No Known Allergies    ROS:  Pertinent positives and negatives are noted in HPI.    EXAM:  General appearance: well appearing female, in no acute distress  Head: normocephalic, atraumatic  Ears: TM's with cone of light, no erythema, canals clear bilaterally  Eyes: conjunctivae normal  Orophayrnx: moist mucous membranes, 3 small red dots on top of soft palette   Neck: supple without adenopathy  Respiratory: clear to auscultation bilaterally  Cardiac: RRR with no murmurs  Dermatological: palms of hands and soles of feet with several red macular lesions  Psychological: normal affect, alert and pleasant      ASSESSMENT AND PLAN:    1. Hand, foot and mouth disease      Sx and exam consistent with hand, foot and mouth. Discussed sx management, s/s that would warrant f/u and when she should f/u. All questions were answered and she is in agreement with plan.         Stacie Siu..................4/23/2018 3:42 PM

## 2018-04-23 NOTE — PATIENT INSTRUCTIONS
Hand, Foot, and Mouth Disease (Child)    Hand, foot, and mouth disease (HFMD) is an illness caused by a virus. It is usually seen in young children. This virus causes small ulcers in the mouth (throat, lips, cheeks, gums, and tongue) and small blisters or red spots may appear on the palms (hands), diaper area, and soles of the feet. There is usually a low-grade fever and poor appetite. HFMD is not a serious illness and usually go away in 1 to 2 weeks. The painful sores in the mouth may prevent your child from eating and drinking.  It takes 3 to 5 days for the illness to appear in an exposed child. Generally, the HFMD is the most contagious during the first week of the illness. Sometimes, people can be contagious for days or weeks after the symptoms have disappeared.  HFMD can be transmitted from person to person by:    Touching your nose, mouth, eye after touching the stool of an infected person (has the virus)    Touching your nose, mouth, eye after touching fluid from the blisters/sores of an infected person    Respiratory secretions (sneezing, coughing, blowing your nose)    Touching contaminated objects (toys, doorknobs)    Oral secretions (kissing)  Home care  Mouth pain  Unless your healthcare provider has prescribed another medicine for mouth pain:    Acetaminophen or ibuprofen may be used for pain or discomfort or fever. Please consult your child's healthcare provider before giving your child acetaminophen or ibuprofen for dosing instructions and when to give the medicine (schedule).  Do not give ibuprofen to an infant 6 months of age or younger. If your child has chronic liver or kidney disease or ever had a stomach ulcer or gastrointestinal bleeding, talk with your healthcare provider before using these medicines. Never give aspirin to anyone under 18 years of age who has a fever. It may cause severe disease (Reye Syndrome) or death. Talk to your child's healthcare provider before giving him or her  over-the counter medicines.    Liquid rinses may be used in children over 12 months of age. Ask your child's healthcare provider for instructions.  Feeding  Follow a soft diet with plenty of fluids to prevent dehydration. If your child doesn't want to eat solid foods, it's OK for a few days, as long as he or she drinks lots of fluid. Cool drinks and frozen treats (sherbet) are soothing and easier to take. Avoid citrus juices (orange juice, lemonade, etc.) and salty or spicy foods. These may cause more pain in the mouth sores.  Return to  or school  Children may usually return to day care or school once the fever is gone and they are eating and drinking well. Contact your healthcare provider and ask when your child is able to return to  or school.  Follow up  Follow up with your child's healthcare provider, or as advised.  When to seek medical advice  Call your child's healthcare provider right away if any of these occur:    Your child complains of pain in the back of the neck    Your child has a severe headache or continued vomiting    Your child is having trouble breathing    Your child is drowsy or has trouble staying awake    Your child is having trouble swallowing    Mouth ulcers are present after 2 weeks    Your child's symptoms are getting worse    Your child appears to be dehydrated (dry mouth, no tears, haven' t urinated is 8 or more hours)    Your child has a fever (see Fever and children, below)  Call 911  Call 911 if any of these occur:    Unusual fussiness, drowsiness, or confusion    Severe headache or vomiting that continues    Trouble breathing    Seizures  Fever and children  Always use a digital thermometer to check your child s temperature. Never use a mercury thermometer.  For infants and toddlers, be sure to use a rectal thermometer correctly. A rectal thermometer may accidentally poke a hole in (perforate) the rectum. It may also pass on germs from the stool. Always follow the  product maker s directions for proper use. If you don t feel comfortable taking a rectal temperature, use another method. When you talk to your child s healthcare provider, tell him or her which method you used to take your child s temperature.  Here are guidelines for fever temperature. Ear temperatures aren t accurate before 6 months of age. Don t take an oral temperature until your child is at least 4 years old.  Infant under 3 months old:    Ask your child s healthcare provider how you should take the temperature.    Rectal or forehead (temporal artery) temperature of 100.4 F (38 C) or higher, or as directed by the provider    Armpit temperature of 99 F (37.2 C) or higher, or as directed by the provider  Child age 3 to 36 months:    Rectal, forehead (temporal artery), or ear temperature of 102 F (38.9 C) or higher, or as directed by the provider    Armpit temperature of 101 F (38.3 C) or higher, or as directed by the provider  Child of any age:    Repeated temperature of 104 F (40 C) or higher, or as directed by the provider    Fever that lasts more than 24 hours in a child under 2 years old. Or a fever that lasts for 3 days in a child 2 years or older.   Date Last Reviewed: 11/1/2017 2000-2017 [employer]. [address] [place] [address], [place] [address]. All rights reserved. This information is not intended as a substitute for professional medical care. Always follow your healthcare professional's instructions.

## 2018-04-23 NOTE — MR AVS SNAPSHOT
After Visit Summary   4/23/2018    Selena Bingham    MRN: [id number]           Patient Information     Date Of Birth          1970        Visit Information        Provider Department      4/23/2018 3:45 PM Stacie Siu APRN CNP [facility]        Today's Diagnoses     Hand, foot and mouth disease    -  1      Care Instructions      Hand, Foot, and Mouth Disease (Child)    Hand, foot, and mouth disease (HFMD) is an illness caused by a virus. It is usually seen in young children. This virus causes small ulcers in the mouth (throat, lips, cheeks, gums, and tongue) and small blisters or red spots may appear on the palms (hands), diaper area, and soles of the feet. There is usually a low-grade fever and poor appetite. HFMD is not a serious illness and usually go away in 1 to 2 weeks. The painful sores in the mouth may prevent your child from eating and drinking.  It takes 3 to 5 days for the illness to appear in an exposed child. Generally, the HFMD is the most contagious during the first week of the illness. Sometimes, people can be contagious for days or weeks after the symptoms have disappeared.  HFMD can be transmitted from person to person by:    Touching your nose, mouth, eye after touching the stool of an infected person (has the virus)    Touching your nose, mouth, eye after touching fluid from the blisters/sores of an infected person    Respiratory secretions (sneezing, coughing, blowing your nose)    Touching contaminated objects (toys, doorknobs)    Oral secretions (kissing)  Home care  Mouth pain  Unless your healthcare provider has prescribed another medicine for mouth pain:    Acetaminophen or ibuprofen may be used for pain or discomfort or fever. Please consult your child's healthcare provider before giving your child acetaminophen or ibuprofen for dosing instructions and when to give the medicine (schedule).  Do not give ibuprofen to an infant 6 months of age  or younger. If your child has chronic liver or kidney disease or ever had a stomach ulcer or gastrointestinal bleeding, talk with your healthcare provider before using these medicines. Never give aspirin to anyone under 18 years of age who has a fever. It may cause severe disease (Reye Syndrome) or death. Talk to your child's healthcare provider before giving him or her over-the counter medicines.    Liquid rinses may be used in children over 12 months of age. Ask your child's healthcare provider for instructions.  Feeding  Follow a soft diet with plenty of fluids to prevent dehydration. If your child doesn't want to eat solid foods, it's OK for a few days, as long as he or she drinks lots of fluid. Cool drinks and frozen treats (sherbet) are soothing and easier to take. Avoid citrus juices (orange juice, lemonade, etc.) and salty or spicy foods. These may cause more pain in the mouth sores.  Return to  or school  Children may usually return to day care or school once the fever is gone and they are eating and drinking well. Contact your healthcare provider and ask when your child is able to return to  or school.  Follow up  Follow up with your child's healthcare provider, or as advised.  When to seek medical advice  Call your child's healthcare provider right away if any of these occur:    Your child complains of pain in the back of the neck    Your child has a severe headache or continued vomiting    Your child is having trouble breathing    Your child is drowsy or has trouble staying awake    Your child is having trouble swallowing    Mouth ulcers are present after 2 weeks    Your child's symptoms are getting worse    Your child appears to be dehydrated (dry mouth, no tears, haven' t urinated is 8 or more hours)    Your child has a fever (see Fever and children, below)  Call 911  Call 911 if any of these occur:    Unusual fussiness, drowsiness, or confusion    Severe headache or vomiting that  continues    Trouble breathing    Seizures  Fever and children  Always use a digital thermometer to check your child s temperature. Never use a mercury thermometer.  For infants and toddlers, be sure to use a rectal thermometer correctly. A rectal thermometer may accidentally poke a hole in (perforate) the rectum. It may also pass on germs from the stool. Always follow the product maker s directions for proper use. If you don t feel comfortable taking a rectal temperature, use another method. When you talk to your child s healthcare provider, tell him or her which method you used to take your child s temperature.  Here are guidelines for fever temperature. Ear temperatures aren t accurate before 6 months of age. Don t take an oral temperature until your child is at least 4 years old.  Infant under 3 months old:    Ask your child s healthcare provider how you should take the temperature.    Rectal or forehead (temporal artery) temperature of 100.4 F (38 C) or higher, or as directed by the provider    Armpit temperature of 99 F (37.2 C) or higher, or as directed by the provider  Child age 3 to 36 months:    Rectal, forehead (temporal artery), or ear temperature of 102 F (38.9 C) or higher, or as directed by the provider    Armpit temperature of 101 F (38.3 C) or higher, or as directed by the provider  Child of any age:    Repeated temperature of 104 F (40 C) or higher, or as directed by the provider    Fever that lasts more than 24 hours in a child under 2 years old. Or a fever that lasts for 3 days in a child 2 years or older.   Date Last Reviewed: 11/1/2017 2000-2017 [employer]. [address] [place] [address], [place] [address]. All rights reserved. This information is not intended as a substitute for professional medical care. Always follow your healthcare professional's instructions.                Follow-ups after your visit        Who to contact     If you have questions or need follow up information  about today's clinic visit or your schedule please contact [facility] directly at [phone].  Normal or non-critical lab and imaging results will be communicated to you by MyChart, letter or phone within 4 business days after the clinic has received the results. If you do not hear from us within 7 days, please contact the clinic through [employer] or phone. If you have a critical or abnormal lab result, we will notify you by phone as soon as possible.  Submit refill requests through [employer] or call your pharmacy and they will forward the refill request to us. Please allow 3 business days for your refill to be completed.          Additional Information About Your Visit        [employer] Score Information     [employer] gives you secure access to your electronic health record. If you see a primary care provider, you can also send messages to your care team and make appointments. If you have questions, please call your primary care clinic.  If you do not have a primary care provider, please call [phone] and they will assist you.        Care EveryWhere ID     This is your Care EveryWhere ID. This could be used by other organizations to access your [place] medical records  [id number]        Your Vitals Were     Pulse Temperature Last Period BMI (Body Mass Index)          80 97.3  F (36.3  C) (Temporal) 04/01/2018 31.97 kg/m2         Blood Pressure from Last 3 Encounters:   04/23/18 124/82   04/11/18 122/80   02/10/17 132/80    Weight from Last 3 Encounters:   04/23/18 183 lb 6 oz (83.2 kg)   04/11/18 186 lb 12.8 oz (84.7 kg)   02/10/17 189 lb (85.7 kg)              Today, you had the following     No orders found for display       Primary Care Provider Office Phone # Fax #    Irma Torres [phone] [address] [facility] [address]        Equal Access to Services     NAVI AGUILAR AH: Maritza Luciano, arun falcon, radha cisneros  ivone vieiracristian more'aan ah. So [facility] [phone].    ATENCIÓN: Si brett silva, tiene a gilmore disposición servicios gratuitos de asistencia lingüística. Yanet al [phone].    We comply with applicable federal civil rights laws and [place] laws. We do not discriminate on the basis of race, color, national origin, age, disability, sex, sexual orientation, or gender identity.            Thank you!     Thank you for choosing [facility] AND [facility]  for your care. Our goal is always to provide you with excellent care. Hearing back from our patients is one way we can continue to improve our services. Please take a few minutes to complete the written survey that you may receive in the mail after your visit with us. Thank you!             Your Updated Medication List - Protect others around you: Learn how to safely use, store and throw away your medicines at www.disposemymeds.org.          This list is accurate as of 4/23/18  3:54 PM.  Always use your most recent med list.                   Brand Name Dispense Instructions for use Diagnosis    ACCU-CHEK ACTIVE STRIPS test strip   Generic drug:  blood glucose monitoring      3 times daily        ASPIRIN LOW DOSE 81 MG tablet   Generic drug:  aspirin     30 tablet    Take by mouth daily        BASAGLAR 100 UNIT/ML injection     63 mL    Inject 60 Units Subcutaneous At Bedtime    Uncontrolled type 2 diabetes mellitus with hyperglycemia, with long-term current use of insulin (H)       blood glucose monitoring lancets      Dispense item per insurance coverage E11.9 IDDM Type 2- Test 3 times a day        FIFTY50 PEN NEEDLES 31G X 5 MM   Generic drug:  insulin pen needle      Dispense item per insurance coverage- Use as directed for administering insulin at home. E11.9 IDDM Type 2        fluticasone 50 MCG/ACT spray    FLONASE     Spray 1 spray in nostril daily        glimepiride 4 MG tablet    AMARYL    90 tablet    Take 1 tablet (4 mg) by mouth every  morning (before breakfast)    Uncontrolled type 2 diabetes mellitus with hyperglycemia, with long-term current use of insulin (H)       metFORMIN 1000 MG tablet    GLUCOPHAGE    180 tablet    TAKE 1 TABLET BY MOUTH TWICE DAILY WITH MEALS    Type 2 diabetes mellitus, uncontrolled (H)       MULTI-VITAMINS Tabs      Take 1 tablet by mouth daily        simvastatin 20 MG tablet    ZOCOR    90 tablet    TAKE 1 TABLET BY MOUTH AT BEDTIME    Hypercholesteremia       traZODone 50 MG tablet    DESYREL     Take 50 mg by mouth At Bedtime

## 2018-04-24 ASSESSMENT — PATIENT HEALTH QUESTIONNAIRE - PHQ9: SUM OF ALL RESPONSES TO PHQ QUESTIONS 1-9: 0

## 2018-05-01 ENCOUNTER — MYC MEDICAL ADVICE (OUTPATIENT)
Dept: FAMILY MEDICINE | Facility: OTHER | Age: 48
End: 2018-05-01

## 2018-05-01 DIAGNOSIS — E11.65 UNCONTROLLED TYPE 2 DIABETES MELLITUS WITH HYPERGLYCEMIA, WITH LONG-TERM CURRENT USE OF INSULIN (H): ICD-10-CM

## 2018-05-01 DIAGNOSIS — E11.65 CONTROLLED TYPE 2 DIABETES MELLITUS WITH HYPERGLYCEMIA, WITHOUT LONG-TERM CURRENT USE OF INSULIN (H): ICD-10-CM

## 2018-05-01 DIAGNOSIS — Z79.4 UNCONTROLLED TYPE 2 DIABETES MELLITUS WITH HYPERGLYCEMIA, WITH LONG-TERM CURRENT USE OF INSULIN (H): ICD-10-CM

## 2018-05-01 RX ORDER — GLIMEPIRIDE 4 MG/1
4 TABLET ORAL
Qty: 90 TABLET | Refills: 4 | Status: SHIPPED | OUTPATIENT
Start: 2018-05-01 | End: 2019-06-10

## 2018-05-03 RX ORDER — INSULIN GLARGINE 100 [IU]/ML
INJECTION, SOLUTION SUBCUTANEOUS
Refills: 0 | OUTPATIENT
Start: 2018-05-03

## 2018-06-20 NOTE — TELEPHONE ENCOUNTER
LOV with PCP was for an annual physical on 4/11/18. Use of Rx as requested is noted in office visit notes on that date without change. Result notes with 4/11/18 labs indicates that patient is to return in 3 months time, or July 2018. Writer will refill Rx as requested for a 90 day supply at this time.    Prescription refilled per RN Medication Refill Policy..................Hesham Jaimes 6/20/2018 1:33 PM

## 2018-06-25 ENCOUNTER — HOSPITAL ENCOUNTER (OUTPATIENT)
Dept: GENERAL RADIOLOGY | Facility: OTHER | Age: 48
Discharge: HOME | End: 2018-06-25
Attending: NURSE PRACTITIONER | Admitting: NURSE PRACTITIONER
Payer: COMMERCIAL

## 2018-06-25 ENCOUNTER — OFFICE VISIT (OUTPATIENT)
Dept: FAMILY MEDICINE | Facility: OTHER | Age: 48
End: 2018-06-25
Attending: NURSE PRACTITIONER
Payer: COMMERCIAL

## 2018-06-25 VITALS
TEMPERATURE: 98.6 F | HEIGHT: 65 IN | WEIGHT: 183.2 LBS | HEART RATE: 68 BPM | BODY MASS INDEX: 30.52 KG/M2 | DIASTOLIC BLOOD PRESSURE: 78 MMHG | SYSTOLIC BLOOD PRESSURE: 120 MMHG

## 2018-06-25 DIAGNOSIS — J06.9 VIRAL URI WITH COUGH: Primary | ICD-10-CM

## 2018-06-25 DIAGNOSIS — R05.9 COUGH: ICD-10-CM

## 2018-06-25 PROCEDURE — 99213 OFFICE O/P EST LOW 20 MIN: CPT | Performed by: NURSE PRACTITIONER

## 2018-06-25 PROCEDURE — 71046 X-RAY EXAM CHEST 2 VIEWS: CPT

## 2018-06-25 RX ORDER — BENZONATATE 200 MG/1
200 CAPSULE ORAL 3 TIMES DAILY PRN
Qty: 21 CAPSULE | Refills: 0 | Status: SHIPPED | OUTPATIENT
Start: 2018-06-25 | End: 2019-06-20

## 2018-06-25 ASSESSMENT — PAIN SCALES - GENERAL: PAINLEVEL: MILD PAIN (2)

## 2018-06-25 NOTE — MR AVS SNAPSHOT
After Visit Summary   6/25/2018    Selena Bingham    MRN: [id number]           Patient Information     Date Of Birth          1970        Visit Information        Provider Department      6/25/2018 9:45 AM Stacie Siu APRN CNP [facility] [place] [facility]        Today's Diagnoses     Viral URI with cough    -  1    Cough          Care Instructions    I will call with radiology report if any concerns  Continue with symptomatic management  Honey is good for coughing      Understanding the Cold Virus  Colds are the most common illness that people get. Most adults get 2 or 3 colds per year, and most children get 5 to 7 colds per year. Colds may be caused by over 200 types of viruses. The most common of these are rhinoviruses ( rhino  refers to the nose).  What causes a cold virus?  All colds start with infection by a virus. You can be infected by more than one cold virus at a time. Infection with cold viruses happens when:    You breathe in a virus from the air. This can happen when someone with a cold sneezes or coughs near you.    You touch your eyes, nose, or mouth when your hand has a cold virus on it. This can happen if you touch an object that has the cold virus on it.  What are the symptoms of a cold virus?  Almost all colds involve a stuffy nose. Other common symptoms include:    Runny nose    Sneezing    Sore throat    Headache    Cough  How is a cold treated?  Colds usually last 5 to 10 days. Treatment focuses on relieving symptoms. Treatments may include:    Decongestant medicines. Several types of decongestants are available without prescription. These may help reduce stuffy or runny nose symptoms.    Prescription or over-the-counter nasal sprays. These may help reduce nasal symptoms, including stuffiness.    Prescription or over-the-counter pain medicines. These can help with headaches and sore throat.    Self-care. This includes extra rest, using humidifiers, and drinking more  fluids. These help you feel better while you are getting over a cold.  Antibiotics are not helpful for a cold. They do not make a cold shorter or relieve symptoms. Taking antibiotics when you don t need them can make them work less well when you need them for another illness.  Follow all directions for using medicines, especially when giving them to children. Contact your healthcare provider if you have any questions about using cold medicines safely.  Can a cold be prevented?  You can help reduce the spread of cold viruses. This can help both you and others avoid getting colds. Follow these tips:    Wash your hands well anytime you may have come into contact with cold viruses. Wash your hands for at least 20 seconds. When you can t wash with soap and water, use an alcohol-based hand .    Don t touch your nose, eyes, or mouth, especially after touching something that may have a cold virus on it.    Cover your mouth and nose when you cough or sneeze. Throw away tissues after using them.    Disinfect things you touch often, such as phones and keyboards.      Stay home when you have a cold.  What are the possible complications of a cold virus?  Colds usually go away by themselves. But it s not unusual to get another type of infection while you have a cold. These can include:    Sinus infection    Lung infection, such as bronchitis or pneumonia    Ear infection  If you have asthma or chronic bronchitis, a cold can make your condition worse.     When should I call my healthcare provider?  Call your healthcare provider right away if you have any of these:    Fever of 100.4 F (38 C) or higher, or as directed    Cough, chest pain, or shortness of breath that gets worse    Symptoms don t get better or get worse after about 10 days    Headache, sleepiness, or confusion that gets worse   Date Last Reviewed: 3/28/2016    4279-3647 [employer]. [address], [place] [address]. All rights  "reserved. This information is not intended as a substitute for professional medical care. Always follow your healthcare professional's instructions.                Follow-ups after your visit        Future tests that were ordered for you today     Open Future Orders        Priority Expected Expires Ordered    XR Chest 2 Views Routine 6/25/2018 6/25/2019 6/25/2018            Who to contact     If you have questions or need follow up information about today's clinic visit or your schedule please contact [facility] directly at [phone].  Normal or non-critical lab and imaging results will be communicated to you by [employer], letter or phone within 4 business days after the clinic has received the results. If you do not hear from us within 7 days, please contact the clinic through [employer] or phone. If you have a critical or abnormal lab result, we will notify you by phone as soon as possible.  Submit refill requests through [employer] or call your pharmacy and they will forward the refill request to us. Please allow 3 business days for your refill to be completed.          Additional Information About Your Visit        [employer] Information     [employer] gives you secure access to your electronic health record. If you see a primary care provider, you can also send messages to your care team and make appointments. If you have questions, please call your primary care clinic.  If you do not have a primary care provider, please call [phone] and they will assist you.        Care EveryWhere ID     This is your Care EveryWhere ID. This could be used by other organizations to access your [place] medical records  [id number]        Your Vitals Were     Pulse Temperature Height BMI (Body Mass Index)          68 98.6  F (37  C) (Tympanic) 5' 4.5\" (1.638 m) 30.96 kg/m2         Blood Pressure from Last 3 Encounters:   06/25/18 120/78   04/23/18 124/82   04/11/18 122/80    Weight from Last 3 Encounters: "   06/25/18 183 lb 3.2 oz (83.1 kg)   04/23/18 183 lb 6 oz (83.2 kg)   04/11/18 186 lb 12.8 oz (84.7 kg)                 Today's Medication Changes          These changes are accurate as of 6/25/18 10:05 AM.  If you have any questions, ask your nurse or doctor.               Start taking these medicines.        Dose/Directions    benzonatate 200 MG capsule   Commonly known as:  TESSALON   Used for:  Cough, Viral URI with cough   Started by:  Stacie Siu APRN CNP        Dose:  200 mg   Take 1 capsule (200 mg) by mouth 3 times daily as needed for cough   Quantity:  21 capsule   Refills:  0            Where to get your medicines      These medications were sent to [facility] - [place], [place] - [address], [facility] [address]     Phone:  [phone]     benzonatate 200 MG capsule                Primary Care Provider Office Phone # Fax #    Irma FERRARO DO Melissa [phone]       [address] [facility] [address]        Equal Access to Services     [facility] AH: Hadii aad ku hadasho Soomaali, waaxda luqadaha, qaybta kaalmada adeegyada, radha graves . So [facility] [phone].    ATENCIÓN: Si habla español, tiene a gilmore disposición servicios gratuitos de asistencia lingüística. Llame al [phone].    We comply with applicable federal civil rights laws and [place] laws. We do not discriminate on the basis of race, color, national origin, age, disability, sex, sexual orientation, or gender identity.            Thank you!     Thank you for choosing [facility]  for your care. Our goal is always to provide you with excellent care. Hearing back from our patients is one way we can continue to improve our services. Please take a few minutes to complete the written survey that you may receive in the mail after your visit with us. Thank you!             Your Updated Medication List -  Protect others around you: Learn how to safely use, store and throw away your medicines at www.disposemymeds.org.          This list is accurate as of 6/25/18 10:05 AM.  Always use your most recent med list.                   Brand Name Dispense Instructions for use Diagnosis    ACCU-CHEK ACTIVE STRIPS test strip   Generic drug:  blood glucose monitoring      3 times daily        ASPIRIN LOW DOSE 81 MG tablet   Generic drug:  aspirin     30 tablet    Take by mouth daily        BASAGLAR 100 UNIT/ML injection     63 mL    Inject 60 Units Subcutaneous At Bedtime    Uncontrolled type 2 diabetes mellitus with hyperglycemia, with long-term current use of insulin (H)       benzonatate 200 MG capsule    TESSALON    21 capsule    Take 1 capsule (200 mg) by mouth 3 times daily as needed for cough    Cough, Viral URI with cough       blood glucose monitoring lancets      Dispense item per insurance coverage E11.9 IDDM Type 2- Test 3 times a day        FIFTY50 PEN NEEDLES 31G X 5 MM   Generic drug:  insulin pen needle      Dispense item per insurance coverage- Use as directed for administering insulin at home. E11.9 IDDM Type 2        fluticasone 50 MCG/ACT spray    FLONASE     Spray 1 spray in nostril daily        glimepiride 4 MG tablet    AMARYL    90 tablet    Take 1 tablet (4 mg) by mouth every morning (before breakfast)    Uncontrolled type 2 diabetes mellitus with hyperglycemia, with long-term current use of insulin (H)       metFORMIN 1000 MG tablet    GLUCOPHAGE    180 tablet    TAKE 1 TABLET BY MOUTH TWICE DAILY WITH MEALS    Type 2 diabetes mellitus, uncontrolled (H)       MULTI-VITAMINS Tabs      Take 1 tablet by mouth daily        simvastatin 20 MG tablet    ZOCOR    90 tablet    TAKE 1 TABLET BY MOUTH AT BEDTIME    Hypercholesteremia       traZODone 50 MG tablet    DESYREL     Take 50 mg by mouth At Bedtime

## 2018-06-25 NOTE — PROGRESS NOTES
"HPI:    Selena Bingham is a 47 year old female who presents to clinic today for UR sx. Has had ear pain, sore throat and non-productive cough for 1 week. Started with left ear pain that progressed to sore throat and cough. Feeling feverish, hoarse voice. Cough getting worse over past 2 nights. Post-tussive emesis last night. She has been taking ibuprofen for sx. No hx of asthma or COPD. Nonsmoker.     Past Medical History:   Diagnosis Date     Type 2 diabetes mellitus without complications (H)     No Comments Provided     Urinary tract infection     UTI 12/8/12     Varicella without complication     Chickenpox       Past Surgical History:   Procedure Laterality Date     OTHER SURGICAL HISTORY      [id number],NO PREVIOUS SURGERY       Family History   Problem Relation Age of Onset     Diabetes Mother      Diabetes,well controlled     Hypertension Mother      Hypertension     Unknown/Adopted Father      Unknown     Family History Negative Brother      Good Health,unsure     Breast Cancer No family hx of      Cancer-breast       Social History     Social History     Marital status: Single     Spouse name: N/A     Number of children: N/A     Years of education: N/A     Occupational History     Not on file.     Social History Main Topics     Smoking status: Never Smoker     Smokeless tobacco: Never Used     Alcohol use No     Drug use: No      Comment: Drug use: No     Sexual activity: Not Currently     Partners: Male     Birth control/ protection: Pill     Other Topics Concern     Not on file     Social History Narrative    Works as a  \"\" @ [employer].  She is not .  + Boyfriend.       Current Outpatient Prescriptions   Medication Sig Dispense Refill     aspirin (ASPIRIN LOW DOSE) 81 MG tablet Take by mouth daily 30 tablet      BASAGLAR 100 UNIT/ML injection Inject 60 Units Subcutaneous At Bedtime 63 mL 4     benzonatate (TESSALON) 200 MG capsule Take 1 capsule (200 mg) by mouth 3 times daily " as needed for cough 21 capsule 0     blood glucose monitoring (ACCU-CHEK ACTIVE STRIPS) test strip 3 times daily       blood glucose monitoring (ACCU-CHEK MULTICLIX) lancets Dispense item per insurance coverage E11.9 IDDM Type 2- Test 3 times a day       fluticasone (FLONASE) 50 MCG/ACT spray Spray 1 spray in nostril daily       glimepiride (AMARYL) 4 MG tablet Take 1 tablet (4 mg) by mouth every morning (before breakfast) 90 tablet 4     insulin pen needle (FIFTY50 PEN NEEDLES) 31G X 5 MM Dispense item per insurance coverage- Use as directed for administering insulin at home. E11.9 IDDM Type 2       metFORMIN (GLUCOPHAGE) 1000 MG tablet TAKE 1 TABLET BY MOUTH TWICE DAILY WITH MEALS 180 tablet 0     Multiple Vitamin (MULTI-VITAMINS) TABS Take 1 tablet by mouth daily       simvastatin (ZOCOR) 20 MG tablet TAKE 1 TABLET BY MOUTH AT BEDTIME 90 tablet 0     traZODone (DESYREL) 50 MG tablet Take 50 mg by mouth At Bedtime         No Known Allergies    ROS:  Pertinent positives and negatives are noted in HPI.    EXAM:  General appearance: well appearing female, in no acute distress  Head: normocephalic, atraumatic  Ears: TM's with cone of light, no erythema, canals clear bilaterally  Eyes: conjunctivae normal  Orophayrnx: moist mucous membranes, tonsils with erythema, no exudates or petechiae, no post nasal drip seen  Neck: supple without adenopathy  Respiratory: clear to auscultation bilaterally, no respiratory distress, occasional nonproductive cough  Cardiac: RRR with no murmurs  Psychological: normal affect, alert and pleasant  Xray: xray independently reviewed and no acute infiltrates appreciated; pending radiology over-read      ASSESSMENT AND PLAN:    1. Viral URI with cough    2. Cough      Tessalon for cough.Symptoms likely due to virus. No antibiotic is needed at this time. Symptoms typically worse on days 3-4 and then begin improving each day. If symptoms begin worsening or fail to improve after 10-14 days,  return to clinic for reevaluation. All questions were answered and she is in agreement with plan.         Stacie Siu..................6/25/2018 9:50 AM

## 2018-06-25 NOTE — PATIENT INSTRUCTIONS
I will call with radiology report if any concerns  Continue with symptomatic management  Honey is good for coughing      Understanding the Cold Virus  Colds are the most common illness that people get. Most adults get 2 or 3 colds per year, and most children get 5 to 7 colds per year. Colds may be caused by over 200 types of viruses. The most common of these are rhinoviruses ( rhino  refers to the nose).  What causes a cold virus?  All colds start with infection by a virus. You can be infected by more than one cold virus at a time. Infection with cold viruses happens when:    You breathe in a virus from the air. This can happen when someone with a cold sneezes or coughs near you.    You touch your eyes, nose, or mouth when your hand has a cold virus on it. This can happen if you touch an object that has the cold virus on it.  What are the symptoms of a cold virus?  Almost all colds involve a stuffy nose. Other common symptoms include:    Runny nose    Sneezing    Sore throat    Headache    Cough  How is a cold treated?  Colds usually last 5 to 10 days. Treatment focuses on relieving symptoms. Treatments may include:    Decongestant medicines. Several types of decongestants are available without prescription. These may help reduce stuffy or runny nose symptoms.    Prescription or over-the-counter nasal sprays. These may help reduce nasal symptoms, including stuffiness.    Prescription or over-the-counter pain medicines. These can help with headaches and sore throat.    Self-care. This includes extra rest, using humidifiers, and drinking more fluids. These help you feel better while you are getting over a cold.  Antibiotics are not helpful for a cold. They do not make a cold shorter or relieve symptoms. Taking antibiotics when you don t need them can make them work less well when you need them for another illness.  Follow all directions for using medicines, especially when giving them to children. Contact your  healthcare provider if you have any questions about using cold medicines safely.  Can a cold be prevented?  You can help reduce the spread of cold viruses. This can help both you and others avoid getting colds. Follow these tips:    Wash your hands well anytime you may have come into contact with cold viruses. Wash your hands for at least 20 seconds. When you can t wash with soap and water, use an alcohol-based hand .    Don t touch your nose, eyes, or mouth, especially after touching something that may have a cold virus on it.    Cover your mouth and nose when you cough or sneeze. Throw away tissues after using them.    Disinfect things you touch often, such as phones and keyboards.      Stay home when you have a cold.  What are the possible complications of a cold virus?  Colds usually go away by themselves. But it s not unusual to get another type of infection while you have a cold. These can include:    Sinus infection    Lung infection, such as bronchitis or pneumonia    Ear infection  If you have asthma or chronic bronchitis, a cold can make your condition worse.     When should I call my healthcare provider?  Call your healthcare provider right away if you have any of these:    Fever of 100.4 F (38 C) or higher, or as directed    Cough, chest pain, or shortness of breath that gets worse    Symptoms don t get better or get worse after about 10 days    Headache, sleepiness, or confusion that gets worse   Date Last Reviewed: 3/28/2016    4691-9635 [employer]. [address] [place] [address], [place] [address]. All rights reserved. This information is not intended as a substitute for professional medical care. Always follow your healthcare professional's instructions.

## 2018-06-25 NOTE — NURSING NOTE
Patient presents in the clinic with concerns of a ear pain, sore throat, and a non-productive cough. These symptoms began over a week ago.  Elizabeth Hernandez LPN 6/25/2018 9:45 AM

## 2018-07-12 DIAGNOSIS — E11.9 DIABETES MELLITUS, TYPE II (H): Primary | ICD-10-CM

## 2018-07-12 DIAGNOSIS — E78.00 HYPERCHOLESTEREMIA: ICD-10-CM

## 2018-07-17 RX ORDER — SIMVASTATIN 20 MG
TABLET ORAL
Qty: 90 TABLET | Refills: 2 | Status: SHIPPED | OUTPATIENT
Start: 2018-07-17 | End: 2019-04-26

## 2018-07-17 RX ORDER — FLURBIPROFEN SODIUM 0.3 MG/ML
SOLUTION/ DROPS OPHTHALMIC
Qty: 100 EACH | Refills: 2 | Status: SHIPPED | OUTPATIENT
Start: 2018-07-17 | End: 2019-06-20

## 2018-07-17 NOTE — TELEPHONE ENCOUNTER
"Refill request from [facility] for:  simvastatin (ZOCOR) 20 MG tablet  B-D U/F insulin pen needle    LOV 6/25/2018 Stacie Siu, APRN CNP    LOV with PCP 4/11/2018    Lipids 4/11/18 OV  \"Hypercholesterolemia  Improved with Zocor; continue to monitor for lipid screening yearly.  - Lipid Profile (Chol, Trig, HDL, LDL calc) - FASTING\"    Last refill of Zocor 3/27/2018 for 90 X 0    Refills given  Requested Prescriptions   Pending Prescriptions Disp Refills     simvastatin (ZOCOR) 20 MG tablet [Pharmacy Med Name: SIMVASTATIN 20MG TABLETS] 90 tablet 0     Sig: TAKE 1 TABLET BY MOUTH AT BEDTIME    Statins Protocol Passed    7/12/2018  8:35 AM       Passed - LDL on file in past 12 months    Recent Labs   Lab Test  04/11/18   1045   LDL  56            Passed - No abnormal creatine kinase in past 12 months    No lab results found.            Passed - Recent (12 mo) or future (30 days) visit within the authorizing provider's specialty    Patient had office visit in the last 12 months or has a visit in the next 30 days with authorizing provider or within the authorizing provider's specialty.  See \"Patient Info\" tab in inbasket, or \"Choose Columns\" in Meds & Orders section of the refill encounter.           Passed - Patient is age 18 or older       Passed - No active pregnancy on record       Passed - No positive pregnancy test in past 12 months        B-D U/F insulin pen needle [Pharmacy Med Name: B-D PEN NDL MINI [id number](3/16)PRPL] 100 each 0     Sig: USE FOR ADMINISTERING INSULIN    Diabetic Supplies Protocol Passed    7/12/2018  8:35 AM       Passed - Patient is 18 years of age or older       Passed - Recent (6 mo) or future (30 days) visit within the authorizing provider's specialty    Patient had office visit in the last 6 months or has a visit in the next 30 days with authorizing provider.  See \"Patient Info\" tab in inbasket, or \"Choose Columns\" in Meds & Orders section of the refill encounter.          Rosa" MARTELL Beck  ....................  7/17/2018   8:55 AM

## 2018-08-20 DIAGNOSIS — E11.9 TYPE 2 DIABETES MELLITUS WITHOUT COMPLICATION, WITH LONG-TERM CURRENT USE OF INSULIN (H): Primary | ICD-10-CM

## 2018-08-20 DIAGNOSIS — Z79.4 TYPE 2 DIABETES MELLITUS WITHOUT COMPLICATION, WITH LONG-TERM CURRENT USE OF INSULIN (H): Primary | ICD-10-CM

## 2018-08-22 DIAGNOSIS — Z79.4 TYPE 2 DIABETES MELLITUS WITHOUT COMPLICATION, WITH LONG-TERM CURRENT USE OF INSULIN (H): ICD-10-CM

## 2018-08-22 DIAGNOSIS — E11.9 TYPE 2 DIABETES MELLITUS WITHOUT COMPLICATION, WITH LONG-TERM CURRENT USE OF INSULIN (H): ICD-10-CM

## 2018-08-22 RX ORDER — BLOOD SUGAR DIAGNOSTIC
STRIP MISCELLANEOUS
Qty: 300 STRIP | Refills: 0 | Status: SHIPPED | OUTPATIENT
Start: 2018-08-22 | End: 2018-09-04

## 2018-08-22 NOTE — TELEPHONE ENCOUNTER
DM Supplies  LOV and AIC-04/11/2018-  Entered by Irma Torres DO at 4/14/2018  9:45 AM   Read by Selena Bingham at 5/1/2018  9:08 AM   Your Hemoglobin A1c was too high!  I will start a medication called Amaryl 4mg daily.   We need to recheck your Hgb A1c in 3 months time; and if this medication is not effective, we will need to consider seeing diabetic education or discussing addition of mealtime insulin.     Irma Torres      Due for follow up exam.  Limited refill per protocol and message left. Maryan Lyn ........   8/22/2018    11:45 AM

## 2018-08-24 NOTE — TELEPHONE ENCOUNTER
Refused, too soon. #300 given on 8-22-18. Pharmacy informed that patient needs diabetic follow-up prior to more refills. Marysol Faulkner RN on 8/24/2018 at 12:06 PM

## 2018-09-04 DIAGNOSIS — E11.9 TYPE 2 DIABETES MELLITUS WITHOUT COMPLICATION, WITH LONG-TERM CURRENT USE OF INSULIN (H): ICD-10-CM

## 2018-09-04 DIAGNOSIS — Z79.4 TYPE 2 DIABETES MELLITUS WITHOUT COMPLICATION, WITH LONG-TERM CURRENT USE OF INSULIN (H): ICD-10-CM

## 2018-09-04 NOTE — TELEPHONE ENCOUNTER
Per Pharmacy fax- We need a new script for meter, strips and lancets because [facility] doesn't make multiclix anymore and that is what she used.     DM Supplies  LOV and AIC-04/11/2018-  Entered by Irma Torres DO at 4/14/2018  9:45 AM   Read by Selena Bingham at 5/1/2018  9:08 AM   Your Hemoglobin A1c was too high!  I will start a medication called Amaryl 4mg daily.   We need to recheck your Hgb A1c in 3 months time; and if this medication is not effective, we will need to consider seeing diabetic education or discussing addition of mealtime insulin.     Irma Torres      Future Office visit:    Next 5 appointments (look out 90 days)     Sep 21, 2018  3:15 PM CDT   SHORT with Irma Torres DO   [facility]    [address]  [place] [address]   [phone]                   Due for exam.  Limited refill per protocol. Upcoming OV scheduled for 09/21/2018. Maryan Lyn ........   9/4/2018    2:17 PM

## 2018-09-21 ENCOUNTER — OFFICE VISIT (OUTPATIENT)
Dept: FAMILY MEDICINE | Facility: OTHER | Age: 48
End: 2018-09-21
Attending: FAMILY MEDICINE
Payer: COMMERCIAL

## 2018-09-21 VITALS
SYSTOLIC BLOOD PRESSURE: 130 MMHG | WEIGHT: 184 LBS | BODY MASS INDEX: 31.1 KG/M2 | DIASTOLIC BLOOD PRESSURE: 82 MMHG | HEART RATE: 76 BPM

## 2018-09-21 DIAGNOSIS — Z79.4 TYPE 2 DIABETES MELLITUS WITHOUT COMPLICATION, WITH LONG-TERM CURRENT USE OF INSULIN (H): ICD-10-CM

## 2018-09-21 DIAGNOSIS — E11.9 TYPE 2 DIABETES MELLITUS WITHOUT COMPLICATION, WITH LONG-TERM CURRENT USE OF INSULIN (H): ICD-10-CM

## 2018-09-21 LAB
ANION GAP SERPL CALCULATED.3IONS-SCNC: 8 MMOL/L (ref 3–14)
BUN SERPL-MCNC: 15 MG/DL (ref 7–25)
CALCIUM SERPL-MCNC: 9.6 MG/DL (ref 8.6–10.3)
CHLORIDE SERPL-SCNC: 103 MMOL/L (ref 98–107)
CO2 SERPL-SCNC: 27 MMOL/L (ref 21–31)
CREAT SERPL-MCNC: 0.76 MG/DL (ref 0.6–1.2)
GFR SERPL CREATININE-BSD FRML MDRD: 82 ML/MIN/1.7M2
GLUCOSE SERPL-MCNC: 64 MG/DL (ref 70–105)
HBA1C MFR BLD: 7.6 % (ref 4–6)
POTASSIUM SERPL-SCNC: 3.6 MMOL/L (ref 3.5–5.1)
SODIUM SERPL-SCNC: 138 MMOL/L (ref 134–144)

## 2018-09-21 PROCEDURE — 36415 COLL VENOUS BLD VENIPUNCTURE: CPT | Performed by: FAMILY MEDICINE

## 2018-09-21 PROCEDURE — 80048 BASIC METABOLIC PNL TOTAL CA: CPT | Performed by: FAMILY MEDICINE

## 2018-09-21 PROCEDURE — 99213 OFFICE O/P EST LOW 20 MIN: CPT | Performed by: FAMILY MEDICINE

## 2018-09-21 PROCEDURE — 83036 HEMOGLOBIN GLYCOSYLATED A1C: CPT | Performed by: FAMILY MEDICINE

## 2018-09-21 NOTE — PROGRESS NOTES
SUBJECTIVE:   Selena Bingham is a 47 year old female who presents to clinic today for the following health issues:    BREANA Walters is here for diabetic check.  She last had a 9.1% ~3-4 months ago and was started on glimepiride daily.  She also remains on metformin 1000mg BID and Basaglar daily.  She is on a baby asa and statin.  Not on an ACEI at this time.  Declines meeting with diabetic educator.  Eye exams at least yearly with Dr. Pettit.  No CP, SOB.  Her fasting blood sugars are 115-140s; rare one higher.  Will occasionally get levels in the 80s, but denies symptoms of hypoglycemia.    Patient Active Problem List    Diagnosis Date Noted     Breast mass, right 02/09/2018     Priority: Medium     Overview:   enlarged       Diabetes mellitus, type II (H) 02/09/2018     Priority: Medium     Hypercholesterolemia 01/09/2014     Priority: Medium     Other abnormal Papanicolaou smear of cervix and cervical HPV(795.09) 11/28/2011     Priority: Medium     Overview:   endometrial cells on otherwise negative pap smear in woman age 40 or over       Anxiety state 03/09/2009     Priority: Medium     Past Medical History:   Diagnosis Date     Type 2 diabetes mellitus without complications (H)     No Comments Provided     Urinary tract infection     UTI 12/8/12     Varicella without complication     Chickenpox      Past Surgical History:   Procedure Laterality Date     OTHER SURGICAL HISTORY      [id number],NO PREVIOUS SURGERY     Family History   Problem Relation Age of Onset     Diabetes Mother      Diabetes,well controlled     Hypertension Mother      Hypertension     Unknown/Adopted Father      Unknown     Family History Negative Brother      Good Health,unsure     Breast Cancer No family hx of      Cancer-breast     Social History   Substance Use Topics     Smoking status: Never Smoker     Smokeless tobacco: Never Used     Alcohol use No     Social History     Social History Narrative    Works as a   "\"\" @ [employer].  She is not .  + Boyfriend.     Current Outpatient Prescriptions   Medication Sig Dispense Refill     blood glucose (NO BRAND SPECIFIED) lancets standard Use to test blood sugar 3 times daily or as directed.Dispense item as covered by patient's insurance 300 each 0     aspirin (ASPIRIN LOW DOSE) 81 MG tablet Take by mouth daily 30 tablet      B-D U/F insulin pen needle USE FOR ADMINISTERING INSULIN 100 each 2     BASAGLAR 100 UNIT/ML injection Inject 60 Units Subcutaneous At Bedtime 63 mL 4     benzonatate (TESSALON) 200 MG capsule Take 1 capsule (200 mg) by mouth 3 times daily as needed for cough 21 capsule 0     blood glucose monitoring (NO BRAND SPECIFIED) meter device kit Use to test blood sugar 3 times daily or as directed.Dispense item as covered by patient's insurance 1 kit 0     blood glucose monitoring (NO BRAND SPECIFIED) test strip Use to test blood sugars 3 times daily or as directed Dispense item as covered by patient's insurance 300 strip 0     fluticasone (FLONASE) 50 MCG/ACT spray Spray 1 spray in nostril daily       glimepiride (AMARYL) 4 MG tablet Take 1 tablet (4 mg) by mouth every morning (before breakfast) 90 tablet 4     metFORMIN (GLUCOPHAGE) 1000 MG tablet TAKE 1 TABLET BY MOUTH TWICE DAILY WITH MEALS 180 tablet 0     Multiple Vitamin (MULTI-VITAMINS) TABS Take 1 tablet by mouth daily       simvastatin (ZOCOR) 20 MG tablet TAKE 1 TABLET BY MOUTH AT BEDTIME 90 tablet 2     traZODone (DESYREL) 50 MG tablet Take 50 mg by mouth At Bedtime       No Known Allergies    Review of Systems   All other systems reviewed and are negative.     OBJECTIVE:     /82 (BP Location: Right arm, Patient Position: Sitting, Cuff Size: Adult Regular)  Pulse 76  Wt 184 lb (83.5 kg)  BMI 31.1 kg/m2  Body mass index is 31.1 kg/(m^2).  Physical Exam   Constitutional: She appears well-developed and well-nourished.   HENT:   Head: Normocephalic and atraumatic.   Right Ear: " External ear normal.   Left Ear: External ear normal.   Cardiovascular: Normal rate and normal heart sounds.    Pulmonary/Chest: Effort normal and breath sounds normal.   Psychiatric: She has a normal mood and affect. Judgment normal.   Nursing note and vitals reviewed.    Diagnostic Test Results:  Results for orders placed or performed in visit on 09/21/18 (from the past 24 hour(s))   Hemoglobin A1c   Result Value Ref Range    Hemoglobin A1C 7.6 (H) 4.0 - 6.0 %   Basic Metabolic Panel   Result Value Ref Range    Sodium 138 134 - 144 mmol/L    Potassium 3.6 3.5 - 5.1 mmol/L    Chloride 103 98 - 107 mmol/L    Carbon Dioxide 27 21 - 31 mmol/L    Anion Gap 8 3 - 14 mmol/L    Glucose 64 (L) 70 - 105 mg/dL    Urea Nitrogen 15 7 - 25 mg/dL    Creatinine 0.76 0.60 - 1.20 mg/dL    GFR Estimate 82 >60 mL/min/1.7m2    GFR Estimate If Black >90 >60 mL/min/1.7m2    Calcium 9.6 8.6 - 10.3 mg/dL     ASSESSMENT/PLAN:     1. Type 2 diabetes mellitus without complication, with long-term current use of insulin (H)  Uncontrolled previously with Hgb A1c 9.1%.  Hgb A1c due today.  If lower, continue on current course.  Encouraged healthy diet and exercise to help improve numbers.  Will consider ACEI in the future  - blood glucose (NO BRAND SPECIFIED) lancets standard; Use to test blood sugar 3 times daily or as directed.Dispense item as covered by patient's insurance  Dispense: 300 each; Refill: 0  - Hemoglobin A1c; Future  - Basic Metabolic Panel; Future  - Hemoglobin A1c  - Basic Metabolic Panel      Irma Torres, [facility]

## 2018-09-21 NOTE — NURSING NOTE
Patient here to have diabetic labs rechecked.  Татьяна Severino............................... 9/21/2018 3:21 PM

## 2018-09-21 NOTE — MR AVS SNAPSHOT
After Visit Summary   9/21/2018    Selena Bingham    MRN: [id number]           Patient Information     Date Of Birth          1970        Visit Information        Provider Department      9/21/2018 3:15 PM Irma Torres DO [facility]        Today's Diagnoses     Type 2 diabetes mellitus without complication, with long-term current use of insulin (H)           Follow-ups after your visit        Who to contact     If you have questions or need follow up information about today's clinic visit or your schedule please contact [facility] directly at [phone].  Normal or non-critical lab and imaging results will be communicated to you by [employer], letter or phone within 4 business days after the clinic has received the results. If you do not hear from us within 7 days, please contact the clinic through [employer] or phone. If you have a critical or abnormal lab result, we will notify you by phone as soon as possible.  Submit refill requests through [employer] or call your pharmacy and they will forward the refill request to us. Please allow 3 business days for your refill to be completed.          Additional Information About Your Visit        MyChart Information     [employer] gives you secure access to your electronic health record. If you see a primary care provider, you can also send messages to your care team and make appointments. If you have questions, please call your primary care clinic.  If you do not have a primary care provider, please call [phone] and they will assist you.        Care EveryWhere ID     This is your Care EveryWhere ID. This could be used by other organizations to access your [place] medical records  [id number]        Your Vitals Were     Pulse BMI (Body Mass Index)                76 31.1 kg/m2           Blood Pressure from Last 3 Encounters:   09/21/18 130/82   06/25/18 120/78   04/23/18 124/82    Weight from Last 3 Encounters:    09/21/18 184 lb (83.5 kg)   06/25/18 183 lb 3.2 oz (83.1 kg)   04/23/18 183 lb 6 oz (83.2 kg)              We Performed the Following     Basic Metabolic Panel     Hemoglobin A1c          Where to get your medicines      These medications were sent to [id number] - [place], [place] - [address]  [address], [place] [address]     Phone:  [phone]     blood glucose lancets standard          Primary Care Provider Office Phone # Fax #    Irma Torres [phone]       [address]  [facility] [address]        Equal Access to Services     NAVI AGUILAR : Hadii isabel aguilar hadasho Soomaali, waaxda luqadaha, qaybta kaalmada adeegyada, radha hoffman. So [facility] [phone].    ATENCIÓN: Si habla español, tiene a gilmore disposición servicios gratuitos de asistencia lingüística. Llame al [phone].    We comply with applicable federal civil rights laws and [place] laws. We do not discriminate on the basis of race, color, national origin, age, disability, sex, sexual orientation, or gender identity.            Thank you!     Thank you for choosing [facility]  for your care. Our goal is always to provide you with excellent care. Hearing back from our patients is one way we can continue to improve our services. Please take a few minutes to complete the written survey that you may receive in the mail after your visit with us. Thank you!             Your Updated Medication List - Protect others around you: Learn how to safely use, store and throw away your medicines at www.disposemymeds.org.          This list is accurate as of 9/21/18 11:59 PM.  Always use your most recent med list.                   Brand Name Dispense Instructions for use Diagnosis    ASPIRIN LOW DOSE 81 MG tablet   Generic drug:  aspirin     30 tablet    Take by mouth daily        B-D U/F 31G X 5 MM   Generic drug:  insulin pen  needle     100 each    USE FOR ADMINISTERING INSULIN    Diabetes mellitus, type II (H)       BASAGLAR 100 UNIT/ML injection     63 mL    Inject 60 Units Subcutaneous At Bedtime    Uncontrolled type 2 diabetes mellitus with hyperglycemia, with long-term current use of insulin (H)       benzonatate 200 MG capsule    TESSALON    21 capsule    Take 1 capsule (200 mg) by mouth 3 times daily as needed for cough    Cough, Viral URI with cough       blood glucose lancets standard    no brand specified    300 each    Use to test blood sugar 3 times daily or as directed.Dispense item as covered by patient's insurance    Type 2 diabetes mellitus without complication, with long-term current use of insulin (H)       blood glucose monitoring meter device kit    no brand specified    1 kit    Use to test blood sugar 3 times daily or as directed.Dispense item as covered by patient's insurance    Type 2 diabetes mellitus without complication, with long-term current use of insulin (H)       blood glucose monitoring test strip    no brand specified    300 strip    Use to test blood sugars 3 times daily or as directed Dispense item as covered by patient's insurance    Type 2 diabetes mellitus without complication, with long-term current use of insulin (H)       fluticasone 50 MCG/ACT spray    FLONASE     Spray 1 spray in nostril daily        glimepiride 4 MG tablet    AMARYL    90 tablet    Take 1 tablet (4 mg) by mouth every morning (before breakfast)    Uncontrolled type 2 diabetes mellitus with hyperglycemia, with long-term current use of insulin (H)       metFORMIN 1000 MG tablet    GLUCOPHAGE    180 tablet    TAKE 1 TABLET BY MOUTH TWICE DAILY WITH MEALS    Type 2 diabetes mellitus, uncontrolled (H)       MULTI-VITAMINS Tabs      Take 1 tablet by mouth daily        simvastatin 20 MG tablet    ZOCOR    90 tablet    TAKE 1 TABLET BY MOUTH AT BEDTIME    Hypercholesteremia       traZODone 50 MG tablet    DESYREL     Take 50 mg by  mouth At Bedtime

## 2018-10-19 NOTE — TELEPHONE ENCOUNTER
Metformin  LOV and A1C09/21/2018  Prescription refilled per RN Medication RefillPolicy.................... Maryan Lyn ....................  10/19/2018   8:20 AM

## 2019-04-26 ENCOUNTER — TELEPHONE (OUTPATIENT)
Dept: FAMILY MEDICINE | Facility: OTHER | Age: 49
End: 2019-04-26

## 2019-04-26 DIAGNOSIS — E11.9 TYPE 2 DIABETES MELLITUS WITHOUT COMPLICATION, WITHOUT LONG-TERM CURRENT USE OF INSULIN (H): Primary | ICD-10-CM

## 2019-04-26 DIAGNOSIS — E78.00 HYPERCHOLESTEREMIA: ICD-10-CM

## 2019-04-30 NOTE — TELEPHONE ENCOUNTER
Patient remains due for every 6 month DM OV and annual labs per protocol. Message left for patient requesting a return call.    Simvastatin & Metformin   LOV-09/21/2018  Last Lipid 04/11/2018 09/21/2018- A1C 7.6      Future Office visit:       Routing refill request to provider for review/approval. Unable to complete prescription refill per RNMedication Refill Policy.................... Maryan Lyn ....................  4/30/2019   1:29 PM

## 2019-05-01 RX ORDER — SIMVASTATIN 20 MG
TABLET ORAL
Qty: 90 TABLET | Refills: 1 | Status: SHIPPED | OUTPATIENT
Start: 2019-05-01 | End: 2019-06-20

## 2019-05-01 NOTE — TELEPHONE ENCOUNTER
Return call to patient and updated that she is coming due for diabetic check visit with labs. Patient verbalized understanding and intent to comply and will call back and schedule an OV. Maryan Lyn RN on 5/1/2019 at 9:30 AM

## 2019-05-16 ENCOUNTER — HOSPITAL ENCOUNTER (OUTPATIENT)
Dept: MAMMOGRAPHY | Facility: OTHER | Age: 49
Discharge: HOME | End: 2019-05-16
Attending: FAMILY MEDICINE | Admitting: FAMILY MEDICINE
Payer: COMMERCIAL

## 2019-05-16 DIAGNOSIS — Z12.39 SCREENING BREAST EXAMINATION: ICD-10-CM

## 2019-05-16 PROCEDURE — 77067 SCR MAMMO BI INCL CAD: CPT

## 2019-06-10 ENCOUNTER — TELEPHONE (OUTPATIENT)
Dept: FAMILY MEDICINE | Facility: OTHER | Age: 49
End: 2019-06-10

## 2019-06-10 DIAGNOSIS — Z79.4 UNCONTROLLED TYPE 2 DIABETES MELLITUS WITH HYPERGLYCEMIA, WITH LONG-TERM CURRENT USE OF INSULIN (H): Primary | ICD-10-CM

## 2019-06-10 DIAGNOSIS — E11.65 UNCONTROLLED TYPE 2 DIABETES MELLITUS WITH HYPERGLYCEMIA, WITH LONG-TERM CURRENT USE OF INSULIN (H): Primary | ICD-10-CM

## 2019-06-10 DIAGNOSIS — E11.9 TYPE 2 DIABETES MELLITUS WITHOUT COMPLICATION, WITHOUT LONG-TERM CURRENT USE OF INSULIN (H): Primary | ICD-10-CM

## 2019-06-10 RX ORDER — GLIMEPIRIDE 4 MG/1
TABLET ORAL
Qty: 30 TABLET | Refills: 0 | Status: SHIPPED | OUTPATIENT
Start: 2019-06-10 | End: 2019-06-20

## 2019-06-10 NOTE — TELEPHONE ENCOUNTER
Pt is requesting a refills of the following to be sent to [facility], noting she is OUT OF BOTH MEDICATIONS:    GLIMEPIRIDE 4MG TABLETS    Sig: TAKE 1 TABLET(4 MG) BY MOUTH EVERY MORNING BEFORE BREAKFAST  Last Prescription Date:   5/1/18  Last Fill Qty/Refills:         90, R-4    Routing refill request to provider for review/approval because:  Sulfonylurea Agents Failed6/10 12:55 PM   Blood pressure less than 140/90 in past 6 months    Patient has documented LDL within the past 12 mos.    Patient has documented A1c within the specified period of time.    Recent (6 mo) or future (30 days) visit within the authorizing provider's specialty     Insulin glargine (LANTUS) 100 UNIT/ML injection  Routing refill request to provider for review/approval because:  Pt states her she used to be on Lantus and then she was switched to Basaglar, due to insurance coverage. Pt received letter, noting as of 5/1, her insurance will no longer cover Basaglar, and she would needs to switch back to Lantus. Please discontinue Basaglar as appropriate.    Last written prescription for Basaglar:  BASAGLAR 100 UNIT/ML injection 63 mL 4 4/11/2018  No   Sig - Route: Inject 60 Units Subcutaneous At Bedtime - Subcutaneous     Last Office Visit:              9/21/18  Future Office visit:           None.    In clinical absence of patient's primary, Irma Torres, patient is requesting that this message be sent to the Doc of the Day for consideration please. Dr. Torres scheduled to return to clinic tomorrow 6/11.    Unable to complete prescription refill per RN Medication Refill Policy. Marysol Potter RN .............. 6/10/2019  1:42 PM

## 2019-06-10 NOTE — TELEPHONE ENCOUNTER
I will provide one month supply for both products. She should schedule a visit for check in the next 30 days.

## 2019-06-10 NOTE — TELEPHONE ENCOUNTER
Per refill protocol, Pt was due for 6-month diabetic check-up, around 3/21/19. Called and spoke to Patient after verifying last name and date of birth. Pt requesting message be sent to PCP, requesting ordering of needed labs, and wondering if lab only appointment would be adequate, or if a follow-up OV is required. Pt requesting call back in regards to this, so she can schedule appointment. PCP scheduled to return tomorrow (6/11). Pt will be home tomorrow until 11:30 AM, and then can be reached at work, after Noon.     Marysol Potter RN .............. 6/10/2019  1:42 PM

## 2019-06-11 NOTE — TELEPHONE ENCOUNTER
Pharmacy confirms that they have received the medication and will text patient.  Patient has an office visit with primary on 6-20-19.        Brie Velásquez LPN 6/11/2019 3:50 PM

## 2019-06-11 NOTE — TELEPHONE ENCOUNTER
Patient notified and appointment scheduled.  Casandra Maria LPN........................6/11/2019  9:22 AM

## 2019-06-20 ENCOUNTER — OFFICE VISIT (OUTPATIENT)
Dept: FAMILY MEDICINE | Facility: OTHER | Age: 49
End: 2019-06-20
Attending: FAMILY MEDICINE
Payer: COMMERCIAL

## 2019-06-20 VITALS
HEIGHT: 65 IN | BODY MASS INDEX: 30.99 KG/M2 | WEIGHT: 186 LBS | RESPIRATION RATE: 18 BRPM | HEART RATE: 80 BPM | SYSTOLIC BLOOD PRESSURE: 128 MMHG | DIASTOLIC BLOOD PRESSURE: 74 MMHG | TEMPERATURE: 97.9 F

## 2019-06-20 DIAGNOSIS — E11.9 TYPE 2 DIABETES MELLITUS WITHOUT COMPLICATION, WITH LONG-TERM CURRENT USE OF INSULIN (H): Primary | ICD-10-CM

## 2019-06-20 DIAGNOSIS — Z79.4 TYPE 2 DIABETES MELLITUS WITHOUT COMPLICATION, WITH LONG-TERM CURRENT USE OF INSULIN (H): Primary | ICD-10-CM

## 2019-06-20 DIAGNOSIS — E78.00 HYPERCHOLESTEREMIA: ICD-10-CM

## 2019-06-20 LAB
ANION GAP SERPL CALCULATED.3IONS-SCNC: 7 MMOL/L (ref 3–14)
BUN SERPL-MCNC: 14 MG/DL (ref 7–25)
CALCIUM SERPL-MCNC: 9.5 MG/DL (ref 8.6–10.3)
CHLORIDE SERPL-SCNC: 103 MMOL/L (ref 98–107)
CHOLEST SERPL-MCNC: 113 MG/DL
CO2 SERPL-SCNC: 29 MMOL/L (ref 21–31)
CREAT SERPL-MCNC: 0.73 MG/DL (ref 0.6–1.2)
GFR SERPL CREATININE-BSD FRML MDRD: 85 ML/MIN/{1.73_M2}
GLUCOSE SERPL-MCNC: 170 MG/DL (ref 70–105)
HBA1C MFR BLD: 8.7 % (ref 4–6)
HDLC SERPL-MCNC: 49 MG/DL (ref 23–92)
LDLC SERPL CALC-MCNC: 40 MG/DL
NONHDLC SERPL-MCNC: 64 MG/DL
POTASSIUM SERPL-SCNC: 4.1 MMOL/L (ref 3.5–5.1)
SODIUM SERPL-SCNC: 139 MMOL/L (ref 134–144)
TRIGL SERPL-MCNC: 120 MG/DL

## 2019-06-20 PROCEDURE — 99214 OFFICE O/P EST MOD 30 MIN: CPT | Performed by: FAMILY MEDICINE

## 2019-06-20 PROCEDURE — 36415 COLL VENOUS BLD VENIPUNCTURE: CPT | Mod: ZL | Performed by: FAMILY MEDICINE

## 2019-06-20 PROCEDURE — 80048 BASIC METABOLIC PNL TOTAL CA: CPT | Mod: ZL | Performed by: FAMILY MEDICINE

## 2019-06-20 PROCEDURE — 80061 LIPID PANEL: CPT | Mod: ZL | Performed by: FAMILY MEDICINE

## 2019-06-20 PROCEDURE — 83036 HEMOGLOBIN GLYCOSYLATED A1C: CPT | Mod: ZL | Performed by: FAMILY MEDICINE

## 2019-06-20 RX ORDER — LISINOPRIL 5 MG/1
5 TABLET ORAL DAILY
Qty: 90 TABLET | Refills: 4 | Status: SHIPPED | OUTPATIENT
Start: 2019-06-20 | End: 2020-07-31

## 2019-06-20 RX ORDER — SIMVASTATIN 20 MG
20 TABLET ORAL AT BEDTIME
Qty: 90 TABLET | Refills: 4 | Status: SHIPPED | OUTPATIENT
Start: 2019-06-20 | End: 2020-07-31

## 2019-06-20 RX ORDER — GLIMEPIRIDE 4 MG/1
4 TABLET ORAL
Qty: 90 TABLET | Refills: 4 | Status: SHIPPED | OUTPATIENT
Start: 2019-06-20 | End: 2020-07-31

## 2019-06-20 ASSESSMENT — ANXIETY QUESTIONNAIRES
3. WORRYING TOO MUCH ABOUT DIFFERENT THINGS: NOT AT ALL
2. NOT BEING ABLE TO STOP OR CONTROL WORRYING: NOT AT ALL
5. BEING SO RESTLESS THAT IT IS HARD TO SIT STILL: NOT AT ALL
1. FEELING NERVOUS, ANXIOUS, OR ON EDGE: NOT AT ALL
7. FEELING AFRAID AS IF SOMETHING AWFUL MIGHT HAPPEN: NOT AT ALL
6. BECOMING EASILY ANNOYED OR IRRITABLE: NOT AT ALL
IF YOU CHECKED OFF ANY PROBLEMS ON THIS QUESTIONNAIRE, HOW DIFFICULT HAVE THESE PROBLEMS MADE IT FOR YOU TO DO YOUR WORK, TAKE CARE OF THINGS AT HOME, OR GET ALONG WITH OTHER PEOPLE: NOT DIFFICULT AT ALL
GAD7 TOTAL SCORE: 0

## 2019-06-20 ASSESSMENT — PATIENT HEALTH QUESTIONNAIRE - PHQ9
5. POOR APPETITE OR OVEREATING: NOT AT ALL
SUM OF ALL RESPONSES TO PHQ QUESTIONS 1-9: 0

## 2019-06-20 ASSESSMENT — MIFFLIN-ST. JEOR: SCORE: 1466.63

## 2019-06-20 ASSESSMENT — PAIN SCALES - GENERAL: PAINLEVEL: NO PAIN (0)

## 2019-06-20 NOTE — PROGRESS NOTES
Nursing Notes:   Marysol Owens LPN  6/20/2019  3:18 PM  Signed  Patient presents to clinic for diabetic check.    Previous A1C is at goal of <8  Lab Results   Component Value Date    A1C 7.6 09/21/2018    A1C 9.1 04/11/2018     Urine microalbumin:creatine: NA  Foot exam today  Eye exam 05/01/19    Tobacco User NO  Patient is on a daily aspirin  Patient is on a Statin.  Blood pressure today of:     BP Readings from Last 1 Encounters:   06/20/19 128/74   is at the goal of <139/89 for diabetics.  Medication Reconciliation: complete  Marysol Owens LPN      SUBJECTIVE:   Selena Bingham is a 48 year old female who presents to clinic today for the following health issues:    BREANA Walters is here for a diabetic check:  Her blood sugars have been stable.  Denies any symptoms of low blood sugars.   Looks at her feet at least every other day; continues to have good sensation.  She checks her blood sugars at random, nothing is consistent.  However she states her fasting blood sugars are about 100-110, and throughout the day they are about 120-140.  She is taking metformin 1000mg BID; glimepiride 4mg daily; Lantus 60u at bedtime.  She is on a baby ASA and statin.  Will discuss ACEI today.  Eye exam is UTD.  No other concerns today.    Patient Active Problem List    Diagnosis Date Noted     Breast mass, right 02/09/2018     Priority: Medium     Overview:   enlarged       Type 2 diabetes mellitus without complication, without long-term current use of insulin (H) 02/09/2018     Priority: Medium     Hypercholesterolemia 01/09/2014     Priority: Medium     Other abnormal Papanicolaou smear of cervix and cervical HPV(795.09) 11/28/2011     Priority: Medium     Overview:   endometrial cells on otherwise negative pap smear in woman age 40 or over       Anxiety state 03/09/2009     Priority: Medium     Past Medical History:   Diagnosis Date     Type 2 diabetes mellitus without complications (H)     No Comments Provided     Urinary  "tract infection     UTI 12/8/12     Varicella without complication     Chickenpox      Past Surgical History:   Procedure Laterality Date     OTHER SURGICAL HISTORY      [id number],NO PREVIOUS SURGERY     Family History   Problem Relation Age of Onset     Diabetes Mother         Diabetes,well controlled     Hypertension Mother         Hypertension     Unknown/Adopted Father         Unknown     Family History Negative Brother         Good Health,unsure     Breast Cancer No family hx of         Cancer-breast     Social History     Tobacco Use     Smoking status: Never Smoker     Smokeless tobacco: Never Used   Substance Use Topics     Alcohol use: No     Alcohol/week: 0.0 oz     Social History     Social History Narrative    Works as a  \"\" @ [employer].  She is not .  + Boyfriend.     Current Outpatient Medications   Medication Sig Dispense Refill     aspirin (ASPIRIN LOW DOSE) 81 MG tablet Take by mouth daily 30 tablet      blood glucose (NO BRAND SPECIFIED) lancets standard Use to test blood sugar 3 times daily or as directed.Dispense item as covered by patient's insurance 300 each 4     blood glucose (NO BRAND SPECIFIED) test strip Use to test blood sugars 3 times daily or as directed Dispense item as covered by patient's insurance 300 strip 4     blood glucose monitoring (NO BRAND SPECIFIED) meter device kit Use to test blood sugar 3 times daily or as directed.Dispense item as covered by patient's insurance 1 kit 0     glimepiride (AMARYL) 4 MG tablet Take 1 tablet (4 mg) by mouth every morning (before breakfast) 90 tablet 4     insulin glargine (LANTUS SOLOSTAR PEN) 100 UNIT/ML pen Inject 60 Units Subcutaneous At Bedtime 60 mL 3     insulin pen needle (B-D U/F) 31G X 5 MM miscellaneous 1 each by Device route At Bedtime 100 each 4     lisinopril (PRINIVIL/ZESTRIL) 5 MG tablet Take 1 tablet (5 mg) by mouth daily 90 tablet 4     metFORMIN (GLUCOPHAGE) 1000 MG tablet Take 1 tablet (1,000 " "mg) by mouth 2 times daily (with meals) 180 tablet 4     Multiple Vitamin (MULTI-VITAMINS) TABS Take 1 tablet by mouth daily       simvastatin (ZOCOR) 20 MG tablet Take 1 tablet (20 mg) by mouth At Bedtime 90 tablet 4     No Known Allergies    Review of Systems   All other systems reviewed and are negative.     OBJECTIVE:     /74 (BP Location: Right arm, Patient Position: Sitting, Cuff Size: Adult Regular)   Pulse 80   Temp 97.9  F (36.6  C) (Tympanic)   Resp 18   Ht 1.638 m (5' 4.5\")   Wt 84.4 kg (186 lb)   Breastfeeding? No   BMI 31.43 kg/m    Body mass index is 31.43 kg/m .  Physical Exam   Constitutional: She is oriented to person, place, and time. She appears well-developed and well-nourished. No distress.   HENT:   Right Ear: Tympanic membrane and external ear normal.   Left Ear: Tympanic membrane and external ear normal.   Nose: Nose normal.   Mouth/Throat: Oropharynx is clear and moist. No oropharyngeal exudate.   Eyes: Pupils are equal, round, and reactive to light. Conjunctivae are normal. Right eye exhibits no discharge. Left eye exhibits no discharge.   Neck: Neck supple. No tracheal deviation present. No thyromegaly present.   Cardiovascular: Normal rate, regular rhythm, S1 normal, S2 normal, normal heart sounds and normal pulses. Exam reveals no S3, no S4 and no friction rub.   No murmur heard.  Pulses:       Dorsalis pedis pulses are 2+ on the right side, and 2+ on the left side.        Posterior tibial pulses are 2+ on the right side, and 2+ on the left side.   Pulmonary/Chest: Effort normal and breath sounds normal. No respiratory distress. She has no wheezes. She has no rales.   Abdominal: Soft. Bowel sounds are normal. She exhibits no mass. There is no hepatosplenomegaly. There is no tenderness.   Musculoskeletal: Normal range of motion. She exhibits no edema.   Feet:   Right Foot:   Protective Sensation: 6 sites tested. 6 sites sensed.   Skin Integrity: Positive for callus. Negative " for skin breakdown, erythema or warmth.   Left Foot:   Protective Sensation: 6 sites tested. 6 sites sensed.   Skin Integrity: Positive for callus. Negative for skin breakdown, erythema or warmth.   Lymphadenopathy:     She has no cervical adenopathy.   Neurological: She is alert and oriented to person, place, and time. She has normal strength and normal reflexes. She exhibits normal muscle tone.   Skin: Skin is warm and dry. No rash noted.   Psychiatric: She has a normal mood and affect. Judgment and thought content normal. Cognition and memory are normal.       Diagnostic Test Results:  Results for orders placed or performed in visit on 06/20/19   Lipid Panel   Result Value Ref Range    Cholesterol 113 <200 mg/dL    Triglycerides 120 <150 mg/dL    HDL Cholesterol 49 23 - 92 mg/dL    LDL Cholesterol Calculated 40 <100 mg/dL    Non HDL Cholesterol 64 <130 mg/dL   Basic Metabolic Panel   Result Value Ref Range    Sodium 139 134 - 144 mmol/L    Potassium 4.1 3.5 - 5.1 mmol/L    Chloride 103 98 - 107 mmol/L    Carbon Dioxide 29 21 - 31 mmol/L    Anion Gap 7 3 - 14 mmol/L    Glucose 170 (H) 70 - 105 mg/dL    Urea Nitrogen 14 7 - 25 mg/dL    Creatinine 0.73 0.60 - 1.20 mg/dL    GFR Estimate 85 >60 mL/min/[1.73_m2]    GFR Estimate If Black >90 >60 mL/min/[1.73_m2]    Calcium 9.5 8.6 - 10.3 mg/dL   Hemoglobin A1c   Result Value Ref Range    Hemoglobin A1C 8.7 (H) 4.0 - 6.0 %       ASSESSMENT/PLAN:         1. Type 2 diabetes mellitus without complication, with long-term current use of insulin (H)  Chronic, stable.  Last hemoglobin A1c was relatively good, hopeful for similar results today.  She is encouraged on continued low-carb eating, regular exercise with a goal of 150 minutes/day.  We discussed renal protection with a possible ACE or ARB medication; and will start lisinopril 5 mg p.o. daily.  She will remain on baby aspirin and statin.  Her medications were refilled today at current doses.  She is due for monitoring  labs to include microalbumin, hemoglobin A1c, BMP, lipid panel.  - blood glucose (NO BRAND SPECIFIED) test strip; Use to test blood sugars 3 times daily or as directed Dispense item as covered by patient's insurance  Dispense: 300 strip; Refill: 4  - blood glucose (NO BRAND SPECIFIED) lancets standard; Use to test blood sugar 3 times daily or as directed.Dispense item as covered by patient's insurance  Dispense: 300 each; Refill: 4  - insulin pen needle (B-D U/F) 31G X 5 MM miscellaneous; 1 each by Device route At Bedtime  Dispense: 100 each; Refill: 4  - metFORMIN (GLUCOPHAGE) 1000 MG tablet; Take 1 tablet (1,000 mg) by mouth 2 times daily (with meals)  Dispense: 180 tablet; Refill: 4  - lisinopril (PRINIVIL/ZESTRIL) 5 MG tablet; Take 1 tablet (5 mg) by mouth daily  Dispense: 90 tablet; Refill: 4  - Albumin Random Urine Quantitative with Creat Ratio  - Lipid Panel  - Basic Metabolic Panel  - Hemoglobin A1c  - glimepiride (AMARYL) 4 MG tablet; Take 1 tablet (4 mg) by mouth every morning (before breakfast)  Dispense: 90 tablet; Refill: 4  - insulin glargine (LANTUS SOLOSTAR PEN) 100 UNIT/ML pen; Inject 60 Units Subcutaneous At Bedtime  Dispense: 60 mL; Refill: 3    2. Hypercholesteremia  Due for monitoring labs.  For now we will refill Zocor 20 mg p.o. daily.  Adjustments will be made if necessary pending lab results.  - simvastatin (ZOCOR) 20 MG tablet; Take 1 tablet (20 mg) by mouth At Bedtime  Dispense: 90 tablet; Refill: 4      Irma Torres [facility]

## 2019-06-20 NOTE — NURSING NOTE
Patient presents to clinic for diabetic check.    Previous A1C is at goal of <8  Lab Results   Component Value Date    A1C 7.6 09/21/2018    A1C 9.1 04/11/2018     Urine microalbumin:creatine: NA  Foot exam today  Eye exam 05/01/19    Tobacco User NO  Patient is on a daily aspirin  Patient is on a Statin.  Blood pressure today of:     BP Readings from Last 1 Encounters:   06/20/19 128/74      is at the goal of <139/89 for diabetics.    Medication Reconciliation: complete    Marysol Owens LPN

## 2019-06-21 ASSESSMENT — ANXIETY QUESTIONNAIRES: GAD7 TOTAL SCORE: 0

## 2019-07-30 DIAGNOSIS — E11.9 TYPE 2 DIABETES MELLITUS WITHOUT COMPLICATION, WITH LONG-TERM CURRENT USE OF INSULIN (H): ICD-10-CM

## 2019-07-30 DIAGNOSIS — Z79.4 TYPE 2 DIABETES MELLITUS WITHOUT COMPLICATION, WITH LONG-TERM CURRENT USE OF INSULIN (H): ICD-10-CM

## 2019-07-31 NOTE — TELEPHONE ENCOUNTER
Prescription approved per [facility] Refill Protocol.  Filled 6-20-19 for #60ml X 5 refills. too soon. Marysol Faulkner RN on 7/31/2019 at 2:54 PM

## 2019-08-20 DIAGNOSIS — E11.9 TYPE 2 DIABETES MELLITUS WITHOUT COMPLICATION, WITH LONG-TERM CURRENT USE OF INSULIN (H): ICD-10-CM

## 2019-08-20 DIAGNOSIS — Z79.4 TYPE 2 DIABETES MELLITUS WITHOUT COMPLICATION, WITH LONG-TERM CURRENT USE OF INSULIN (H): ICD-10-CM

## 2019-08-26 NOTE — TELEPHONE ENCOUNTER
Filled 06/20/19 #60 ml x 3. Pharmacy alerted. Unable to complete prescription refill per RNMedication Refill Policy.................... Maryan Lyn RN ....................  8/26/2019   11:34 AM      insulin glargine (LANTUS SOLOSTAR PEN) 100 UNIT/ML pen 60 mL 3 6/20/2019  No   Sig - Route: Inject 60 Units Subcutaneous At Bedtime - Subcutaneous   Sent to pharmacy as: insulin glargine (LANTUS SOLOSTAR PEN) 100 UNIT/ML pen   Class: E-Prescribe   Order: [id number]   E-Prescribing Status: Receipt confirmed by pharmacy (6/20/2019  3:37 PM CDT)   Printout Tracking     External Result Report   Pharmacy     [id number] - [place], [place] - [address]

## 2019-12-26 ENCOUNTER — OFFICE VISIT (OUTPATIENT)
Dept: FAMILY MEDICINE | Facility: OTHER | Age: 49
End: 2019-12-26
Attending: PHYSICIAN ASSISTANT
Payer: COMMERCIAL

## 2019-12-26 VITALS
DIASTOLIC BLOOD PRESSURE: 82 MMHG | SYSTOLIC BLOOD PRESSURE: 124 MMHG | OXYGEN SATURATION: 98 % | BODY MASS INDEX: 32.17 KG/M2 | HEIGHT: 64 IN | WEIGHT: 188.4 LBS | HEART RATE: 98 BPM | RESPIRATION RATE: 18 BRPM | TEMPERATURE: 99.1 F

## 2019-12-26 DIAGNOSIS — J01.00 ACUTE NON-RECURRENT MAXILLARY SINUSITIS: Primary | ICD-10-CM

## 2019-12-26 PROCEDURE — 99214 OFFICE O/P EST MOD 30 MIN: CPT | Performed by: PHYSICIAN ASSISTANT

## 2019-12-26 RX ORDER — AMOXICILLIN 875 MG
875 TABLET ORAL 2 TIMES DAILY
Qty: 14 TABLET | Refills: 0 | Status: SHIPPED | OUTPATIENT
Start: 2019-12-26 | End: 2020-01-02

## 2019-12-26 RX ORDER — LANCETS
EACH MISCELLANEOUS
COMMUNITY
Start: 2019-09-23 | End: 2024-01-26

## 2019-12-26 ASSESSMENT — PAIN SCALES - GENERAL: PAINLEVEL: MODERATE PAIN (4)

## 2019-12-26 ASSESSMENT — MIFFLIN-ST. JEOR: SCORE: 1456.64

## 2019-12-26 NOTE — NURSING NOTE
"Chief Complaint   Patient presents with     Sinus Problem     Has been having sinus pain under her right eye for 3 weeks dentist did look and did say it wasn't her teeth or gums. She states that heat has helped it. And chewing food on that side does hurt after.     Initial LMP 12/21/2019  Estimated body mass index is 31.43 kg/m  as calculated from the following:    Height as of 6/20/19: 1.638 m (5' 4.5\").    Weight as of 6/20/19: 84.4 kg (186 lb).    Medication Reconciliation: complete      Bijan Stevens LPN  "

## 2019-12-26 NOTE — PROGRESS NOTES
"SUBJECTIVE: Selena Binhgam is a 49 year old female patient complaining of sinus pain on right maxillary sinus for 3 weeks, course is gradually worsening  Associated symptoms: sinus pain 4/10 constant discomfort, teeth pain, clear sinus drainage, PND    Treatments:ibuprofen, nothing today  She did she her dentist 12/9/19 and he did not feel the pain was from an infected tooth at that time.       Past Medical History:   Diagnosis Date     Type 2 diabetes mellitus without complications (H)     No Comments Provided     Urinary tract infection     UTI 12/8/12     Varicella without complication     Chickenpox     Current Outpatient Medications   Medication     aspirin (ASPIRIN LOW DOSE) 81 MG tablet     blood glucose (NO BRAND SPECIFIED) lancets standard     blood glucose (NO BRAND SPECIFIED) test strip     blood glucose monitoring (NO BRAND SPECIFIED) meter device kit     blood glucose monitoring (SOFTCLIX) lancets     glimepiride (AMARYL) 4 MG tablet     insulin glargine (LANTUS SOLOSTAR PEN) 100 UNIT/ML pen     insulin pen needle (B-D U/F) 31G X 5 MM miscellaneous     lisinopril (PRINIVIL/ZESTRIL) 5 MG tablet     metFORMIN (GLUCOPHAGE) 1000 MG tablet     Multiple Vitamin (MULTI-VITAMINS) TABS     simvastatin (ZOCOR) 20 MG tablet     No current facility-administered medications for this visit.       No Known Allergies      ROS  General: feels well, no fever  HENT: POSITIVE per HPI  Respiratory negative  Abdomen: negative    OBJECTIVE:   Vitals:    12/26/19 1319   BP: 124/82   Pulse: 98   Resp: 18   Temp: 99.1  F (37.3  C)   TempSrc: Tympanic   SpO2: 98%   Weight: 85.5 kg (188 lb 6.4 oz)   Height: 1.613 m (5' 3.5\")     The patient appears healthy, alert and no distress.   EARS: mild effusion  NOSE/SINUS: positive findings: mucosa erythematous and swollen  Sinus palpation: Maxillary sinus tender to palpation   THROAT: moderate erythema   NECK:Neck supple. No adenopathy. Thyroid symmetric, normal size,   Cardiac: normal RR, " no murmur  CHEST: normal respiration, clear to ausculation    ASSESSMENT:   (J01.00) Acute non-recurrent maxillary sinusitis  (primary encounter diagnosis)  Plan: amoxicillin (AMOXIL) 875 MG tablet    3 week course of maxillary sinus pain on right side  No dental pain, sensitivity, abscess  Will treat for maxillary sinusitis  RX per EPIC Start amoxicillin 875 mg oral tablet, take twice daily for 7 days  Discussed symptomatic treatments per AVS  Follow up with PCP if symptoms persist or worsen  Patient received verbal and written instruction including review of warning signs    Penelope Richards PA-C on 12/26/2019 at 2:08 PM          .

## 2019-12-26 NOTE — PATIENT INSTRUCTIONS
Acute maxillary sinusitis  Start amoxicillin 875 mg oral tablet, take twice daily for 7 days  Warm compress, hot steamy shower  OTC guaifenesin (mucinex/robitussin)  OTC decongestant (sudafed),   OTC 3 day nasal spray - Afrin, OTC inhaled corticosteroid - Flonase,   OTC antihistamine - cetirizine as directed,   OTC Nasal sinus rinse.   Ibuprofen or tylenol as directed for discomfort or fever  Return to clinic if symptoms persist or worsen    Patient Education     Sinusitis (Antibiotic Treatment)    The sinuses are air-filled spaces within the bones of the face. They connect to the inside of the nose. Sinusitis is an inflammation of the tissue that lines the sinuses. Sinusitis can occur during a cold. It can also happen due to allergies to pollens and other particles in the air. Sinusitis can cause symptoms of sinus congestion and a feeling of fullness. A sinus infection causes fever, headache, and facial pain. There is often green or yellow fluid draining from the nose or into the back of the throat (post-nasal drip). You have been given antibiotics to treat this condition.  Home care    Take the full course of antibiotics as instructed. Do not stop taking them, even when you feel better.    Drink plenty of water, hot tea, and other liquids. This may help thin nasal mucus. It also may help your sinuses drain fluids.    Heat may help soothe painful areas of your face. Use a towel soaked in hot water. Or,  the shower and direct the warm spray onto your face. Using a vaporizer along with a menthol rub at night may also help soothe symptoms.     An expectorant with guaifenesin may help thin nasal mucus and help your sinuses drain fluids.    You can use an over-the-counter decongestant, unless a similar medicine was prescribed to you. Nasal sprays work the fastest. Use one that contains phenylephrine or oxymetazoline. First blow your nose gently. Then use the spray. Do not use these medicines more often than  directed on the label. If you do, your symptoms may get worse. You may also take pills that contain pseudoephedrine. Don t use products that combine multiple medicines. This is because side effects may be increased. Read labels. You can also ask the pharmacist for help. (People with high blood pressure should not use decongestants. They can raise blood pressure.)    Over-the-counter antihistamines may help if allergies contributed to your sinusitis.      Do not use nasal rinses or irrigation during an acute sinus infection, unless your healthcare provider tells you to. Rinsing may spread the infection to other areas in your sinuses.    Use acetaminophen or ibuprofen to control pain, unless another pain medicine was prescribed to you. If you have chronic liver or kidney disease or ever had a stomach ulcer, talk with your healthcare provider before using these medicines. (Aspirin should never be taken by anyone under age 18 who is ill with a fever. It may cause severe liver damage.)    Don't smoke. This can make symptoms worse.  Follow-up care  Follow up with your healthcare provider or our staff if you are not better in 1 week.  When to seek medical advice  Call your healthcare provider if any of these occur:    Facial pain or headache that gets worse    Stiff neck    Unusual drowsiness or confusion    Swelling of your forehead or eyelids    Vision problems, such as blurred or double vision    Fever of 100.4 F (38 C) or higher, or as directed by your healthcare provider    Seizure    Breathing problems    Symptoms don't go away in 10 days  Prevention  Here are steps you can take to help prevent an infection:    Keep good hand washing habits.    Don t have close contact with people who have sore throats, colds, or other upper respiratory infections.    Don t smoke, and stay away from secondhand smoke.    Stay up to date with of your vaccines.  Date Last Reviewed: 11/1/2017 2000-2018 [employer] [address]  [place] [address]. All rights reserved. This information is not intended as a substitute for professional medical care. Always follow your healthcare professional's instructions.

## 2020-02-12 DIAGNOSIS — Z79.4 TYPE 2 DIABETES MELLITUS WITHOUT COMPLICATION, WITH LONG-TERM CURRENT USE OF INSULIN (H): ICD-10-CM

## 2020-02-12 DIAGNOSIS — E11.9 TYPE 2 DIABETES MELLITUS WITHOUT COMPLICATION, WITH LONG-TERM CURRENT USE OF INSULIN (H): ICD-10-CM

## 2020-02-12 DIAGNOSIS — E78.00 HYPERCHOLESTEREMIA: ICD-10-CM

## 2020-02-13 RX ORDER — SIMVASTATIN 20 MG
TABLET ORAL
Qty: 90 TABLET | Refills: 4 | OUTPATIENT
Start: 2020-02-13

## 2020-02-13 NOTE — TELEPHONE ENCOUNTER
[facility] sent Rx request for the following:      SIMVASTATIN 20MG TABLETS  Sig: TAKE 1 TABLET BY MOUTH AT BEDTIME     Last Prescription Date:   6/20/2019  Last Fill Qty/Refills:         90, R-4    Last Office Visit:              6/20/2019  Future Office visit:           none      Redundant refill request refused: Too soon: Patient's current prescription is active and has refills until June 2020. Demetrius Arnold RN, BSN  ....................  2/13/2020   9:05 AM

## 2020-06-19 DIAGNOSIS — E11.9 TYPE 2 DIABETES MELLITUS WITHOUT COMPLICATION, WITH LONG-TERM CURRENT USE OF INSULIN (H): ICD-10-CM

## 2020-06-19 DIAGNOSIS — Z79.4 TYPE 2 DIABETES MELLITUS WITHOUT COMPLICATION, WITH LONG-TERM CURRENT USE OF INSULIN (H): ICD-10-CM

## 2020-06-19 NOTE — LETTER
June 22, 2020      Selena Bingham  [address] [address]        Dear Selena,     A refill request was received from your pharmacy for Metformin.    Additional refills require an office visit with Dr. Torres for annual review and labs.    Please call [phone] to schedule appointment.      Sincerely,        Refill Nurse

## 2020-06-22 NOTE — TELEPHONE ENCOUNTER
Routing refill request to provider for review/approval because:  Labs not current:  HGBA1C  Patient needs to be seen because it has been more than 1 year since last office visit.      LOV: 6/20/2019  Patient if due for annual review and labs.    Letter and my chart message sent.    Cari Tarango RN on 6/22/2020 at 10:03 AM

## 2020-06-27 DIAGNOSIS — E11.9 TYPE 2 DIABETES MELLITUS WITHOUT COMPLICATION, WITHOUT LONG-TERM CURRENT USE OF INSULIN (H): Primary | ICD-10-CM

## 2020-06-29 NOTE — TELEPHONE ENCOUNTER
[facility] sent Rx request for the following:   lisinopril (PRINIVIL/ZESTRIL) 5 MG tablet   Sig:Route: Take 1 tablet (5 mg) by mouth daily - Oral     Last Prescription Date:   06/20/2019  Last Fill Qty/Refills:         90, R-4    Last Office Visit:              06/20/2019   Future Office visit:           07/31/2020    Routing refill request to provider for review/approval because:    Labs not current:  Creat and K+      Per MD note from office visit on 06/20/2019, pt was to follow up in 12/2019. Will route to provider for review and consideration.    Casandra Colindres RN  ....................  6/29/2020   2:59 PM

## 2020-06-30 RX ORDER — LISINOPRIL 2.5 MG/1
TABLET ORAL
Qty: 90 TABLET | Refills: 0 | Status: SHIPPED | OUTPATIENT
Start: 2020-06-30 | End: 2020-07-31

## 2020-07-02 DIAGNOSIS — E11.9 TYPE 2 DIABETES MELLITUS WITHOUT COMPLICATION, WITHOUT LONG-TERM CURRENT USE OF INSULIN (H): ICD-10-CM

## 2020-07-02 RX ORDER — LISINOPRIL 2.5 MG/1
TABLET ORAL
Qty: 180 TABLET | OUTPATIENT
Start: 2020-07-02

## 2020-07-02 NOTE — TELEPHONE ENCOUNTER
lisinopril (ZESTRIL) 2.5 MG tablet  90 tablet  0  6/30/2020   No    Sig: TAKE 2 TABLETS BY MOUTH EVERY DAY     Patient due for appointment    Patient has appointment on 7/31/2020  Cari Tarango RN on 7/2/2020 at 3:10 PM

## 2020-07-29 NOTE — PROGRESS NOTES
ANNUAL PHYSICAL - FEMALE  HPI: Selena presents for a yearly exam.  Concerns include:  1. DM stable - AM sugars ~100-125.  Doesn't always check numbers.  Denies symptoms of hypoglycemia.  On metformin 1000mg BID, Amaryl 4mg daily, Lantus 60u at bedtime.   Also on lisinopril, zocor and asa.  UTD on eye exam.  2. Continues to have some R>L back/shoulder pains.  Asymmetric breast size.    3. Labile moods - can be irritable, angry, happy, sad all within an hour.  Seems to be worsening over the last 6-12 months.  Cycles with menses.  Still monthly for the most part but some waxing and waning noted.    PHQ 2/9/2016 4/23/2018 6/20/2019   PHQ-9 Total Score 1 0 0   Q9: Thoughts of better off dead/self-harm past 2 weeks Not at all Not at all Not at all     MARCELA-7 SCORE 1/29/2015 6/20/2019   Total Score 3 0       Patient's last menstrual period was 07/15/2020 (exact date).   Contraception: none; aware of risk of pregnancy.  Risk for STI?: No  Periods: waxing and waning in flow; somewhat irregular at times.  Increased irritability/mood.  Last pap: 2/9/2016; negative co-testing. Due 2/9/2021, collecting today due to Covid-19 pandemic and trying to limit clinic exposures.  Any hx of abnormal paps:  LSIL in 6/2009 with multiple negative testing since.  FH ofearly CA?: No  Cholesterol/DM concerns/screening: Due  Tobacco?: No  Calcium intake: multivitamin and dietary; no extra Ca++.  DEXA: @ 65  Last mammo: 5/16/2019; normal.  Colonoscopy: @ 50; Due this fall.  Immunizations: Tdap 2/10/2017; recommend yearly flu vaccines; Shingrix @ 50.  Had PSV23 due to DM.  Will repeat PNA vaccines @ 65/66.  Has completed Hep B x3.    Patient Active Problem List   Diagnosis     Other abnormal Papanicolaou smear of cervix and cervical HPV(795.09)     Anxiety state     Breast mass, right     Type 2 diabetes mellitus without complication, without long-term current use of insulin (H)     Hypercholesterolemia     Past Medical History:   Diagnosis Date  "    Type 2 diabetes mellitus without complications (H)     No Comments Provided     Urinary tract infection     UTI 12/8/12     Varicella without complication     Chickenpox     Past Surgical History:   Procedure Laterality Date     OTHER SURGICAL HISTORY      [id number],NO PREVIOUS SURGERY     Social History     Socioeconomic History     Marital status: Single     Spouse name: Not on file     Number of children: Not on file     Years of education: Not on file     Highest education level: Not on file   Occupational History     Not on file   Social Needs     Financial resource strain: Not on file     Food insecurity     Worry: Not on file     Inability: Not on file     Transportation needs     Medical: Not on file     Non-medical: Not on file   Tobacco Use     Smoking status: Never Smoker     Smokeless tobacco: Never Used   Substance and Sexual Activity     Alcohol use: Yes     Alcohol/week: 0.0 standard drinks     Comment: occational     Drug use: No     Comment: Drug use: No     Sexual activity: Not Currently     Partners: Male     Birth control/protection: Pill   Lifestyle     Physical activity     Days per week: Not on file     Minutes per session: Not on file     Stress: Not on file   Relationships     Social connections     Talks on phone: Not on file     Gets together: Not on file     Attends Mosque service: Not on file     Active member of club or organization: Not on file     Attends meetings of clubs or organizations: Not on file     Relationship status: Not on file     Intimate partner violence     Fear of current or ex partner: Not on file     Emotionally abused: Not on file     Physically abused: Not on file     Forced sexual activity: Not on file   Other Topics Concern     Parent/sibling w/ CABG, MI or angioplasty before 65F 55M? Not Asked   Social History Narrative    Works as a  \"\" @ [employer].  She is not .  + Boyfriend.     Family History   Problem Relation Age of " Onset     Diabetes Mother         Diabetes,well controlled     Hypertension Mother         Hypertension     Unknown/Adopted Father         Unknown     Family History Negative Brother         Good Health,unsure     Breast Cancer No family hx of         Cancer-breast     Current Outpatient Medications   Medication Sig Dispense Refill     Calcium Carb-Cholecalciferol (CALCIUM 600 + D) 600-200 MG-UNIT TABS Take 1 tablet by mouth daily       glimepiride (AMARYL) 4 MG tablet Take 1 tablet (4 mg) by mouth every morning (before breakfast) 90 tablet 4     insulin glargine (LANTUS PEN) 100 UNIT/ML pen Inject 60 Units Subcutaneous At Bedtime 60 mL 4     lisinopril (ZESTRIL) 2.5 MG tablet TAKE 2 TABLETS BY MOUTH EVERY  tablet 4     metFORMIN (GLUCOPHAGE) 1000 MG tablet TAKE 1 TABLET BY MOUTH TWICE DAILY WITH MEALS 180 tablet 4     simvastatin (ZOCOR) 20 MG tablet Take 1 tablet (20 mg) by mouth At Bedtime 90 tablet 4     venlafaxine (EFFEXOR-XR) 37.5 MG 24 hr capsule Take 1 capsule (37.5 mg) by mouth daily 90 capsule 1     aspirin (ASPIRIN LOW DOSE) 81 MG tablet Take by mouth daily 30 tablet      blood glucose (NO BRAND SPECIFIED) lancets standard Use to test blood sugar 3 times daily or as directed.Dispense item as covered by patient's insurance 300 each 4     blood glucose (NO BRAND SPECIFIED) test strip Use to test blood sugars 3 times daily or as directed Dispense item as covered by patient's insurance 300 strip 4     blood glucose monitoring (NO BRAND SPECIFIED) meter device kit Use to test blood sugar 3 times daily or as directed.Dispense item as covered by patient's insurance 1 kit 0     blood glucose monitoring (SOFTCLIX) lancets TEST TID       insulin pen needle (B-D U/F) 31G X 5 MM miscellaneous 1 each by Device route At Bedtime 100 each 4     Multiple Vitamin (MULTI-VITAMINS) TABS Take 1 tablet by mouth daily        REVIEW OF SYSTEMS:  Refer to HPI; all other systems reviewed and negative.    PHYSICAL EXAM:  BP  "128/78   Pulse 72   Temp 97.4  F (36.3  C) (Tympanic)   Resp 16   Ht 1.619 m (5' 3.75\")   Wt 84.6 kg (186 lb 8 oz)   LMP 07/15/2020 (Exact Date)   BMI 32.26 kg/m    CONSTITUTIONAL:  Alert, cooperative, NAD.  EYES: No scleral icterus.  PERRLA.  Conjunctiva clear.  ENT/MOUTH: External ears and nose normal.  TMs normal.  Moist mucous membranes. Oropharynx clear.    ENDO: No thyromegaly or thyroid nodules.  LYMPH:  No cervical or supraclavicular LA.    BREASTS: Asymmetric, with R breast significantly larger than L.  Some dermatitis/hyperpigmentation seen below R breast and telangiectasias on breast itself compared to L side.  CARDIOVASCULAR: Regular, S1, S2.  No S3 or S4.  No murmur/gallop/rub.  No peripheral edema.  RESPIRATORY: CTA bilaterally, no wheezes, rhonchi or rales.  GI: Bowel sounds wnl.  Soft, nontender, non-distended.  No masses or HSM.  No rebound or guarding.  : Vulva: normal, no lesions or discharge  Urethral meatus: normal size and location, no lesions or discharge  Urethra: no tenderness or masses  Bladder: no fullness or tenderness  Vagina: normal appearance, no abnormal discharge, no lesions.  No evidence of cystocele or rectocele.  Cervix: normal appearance, no lesions, no abnormal discharge.  Uterus: normal size and position, mobile, non-tender  Adnexa: nopalpable masses bilaterally. No cervical motion tenderness.  Pap smear obtained: Yes  MSKEL: Grossly normal ROM.  No clubbing.  INTEGUMENTARY:Warm, dry.  No rash noted on exposed skin. + skin tags scattered - large pigmented one on mid back.  NEUROLOGIC: Facies symmetric.  Grossly normal movement and tone.  No tremor.  PSYCHIATRIC: Affect normal.  Speech fluent.      PHQ 2/9/2016 4/23/2018 6/20/2019   PHQ-9 Total Score 1 0 0   Q9: Thoughts of better off dead/self-harm past 2 weeks Not at all Not at all Not at all     MARCELA-7 SCORE 1/29/2015 6/20/2019   Total Score 3 0       Results for orders placed or performed in visit on 07/31/20 "   Hemoglobin A1c     Status: Abnormal   Result Value Ref Range    Hemoglobin A1C 7.2 (H) 4.0 - 6.0 %   Lipid Panel     Status: None   Result Value Ref Range    Cholesterol 124 <200 mg/dL    Triglycerides 70 <150 mg/dL    HDL Cholesterol 53 23 - 92 mg/dL    LDL Cholesterol Calculated 57 <100 mg/dL    Non HDL Cholesterol 71 <130 mg/dL   Comprehensive Metabolic Panel     Status: None   Result Value Ref Range    Sodium 138 134 - 144 mmol/L    Potassium 3.8 3.5 - 5.1 mmol/L    Chloride 104 98 - 107 mmol/L    Carbon Dioxide 26 21 - 31 mmol/L    Anion Gap 8 3 - 14 mmol/L    Glucose 102 70 - 105 mg/dL    Urea Nitrogen 18 7 - 25 mg/dL    Creatinine 0.81 0.60 - 1.20 mg/dL    GFR Estimate 75 >60 mL/min/[1.73_m2]    GFR Estimate If Black >90 >60 mL/min/[1.73_m2]    Calcium 9.0 8.6 - 10.3 mg/dL    Bilirubin Total 0.3 0.3 - 1.0 mg/dL    Albumin 4.4 3.5 - 5.7 g/dL    Protein Total 7.6 6.4 - 8.9 g/dL    Alkaline Phosphatase 39 34 - 104 U/L    ALT 22 7 - 52 U/L    AST 22 13 - 39 U/L       ASSESSMENT/PLAN:  1. Routine history and physical examination of adult    2. Type 2 diabetes mellitus without complication, with long-term current use of insulin (H)  Chronic; stable.  Monitoring labs and stable Hgb A1c near goal.  Continue to work on diet/exercise and weight loss.  No changes to medications today - all refilled x 1 year.  Encouraged q6 month visits for DM.  - glimepiride (AMARYL) 4 MG tablet; Take 1 tablet (4 mg) by mouth every morning (before breakfast)  Dispense: 90 tablet; Refill: 4  - insulin glargine (LANTUS PEN) 100 UNIT/ML pen; Inject 60 Units Subcutaneous At Bedtime  Dispense: 60 mL; Refill: 4  - lisinopril (ZESTRIL) 2.5 MG tablet; TAKE 2 TABLETS BY MOUTH EVERY DAY  Dispense: 180 tablet; Refill: 4  - metFORMIN (GLUCOPHAGE) 1000 MG tablet; TAKE 1 TABLET BY MOUTH TWICE DAILY WITH MEALS  Dispense: 180 tablet; Refill: 4  - simvastatin (ZOCOR) 20 MG tablet; Take 1 tablet (20 mg) by mouth At Bedtime  Dispense: 90 tablet; Refill:  4  - Hemoglobin A1c  - Comprehensive Metabolic Panel    3. Hypercholesteremia  Chronic; will monitor lipid panel today.  Remains on statin due to DM as well.  Refilled x 1 year.  - simvastatin (ZOCOR) 20 MG tablet; Take 1 tablet (20 mg) by mouth At Bedtime  Dispense: 90 tablet; Refill: 4  - Lipid Panel  - Comprehensive Metabolic Panel    4. Cervical cancer screening  Collected pap smear today.  - HPV High Risk Types DNA Cervical  - Pap Screen Thin Prep with HPV - recommended age 30 - 65 years (select HPV order below)    5. Labile mood  Worsening.  Likely perimenopausal.  Rx for venlafaxine at lower dose; follow up in ~4-6 weeks to determine efficacy and need for dose adjustment.  - venlafaxine (EFFEXOR-XR) 37.5 MG 24 hr capsule; Take 1 capsule (37.5 mg) by mouth daily  Dispense: 90 capsule; Refill: 1    6. Perimenopausal  See above.  - venlafaxine (EFFEXOR-XR) 37.5 MG 24 hr capsule; Take 1 capsule (37.5 mg) by mouth daily  Dispense: 90 capsule; Refill: 1    7. Visit for screening mammogram  Ordered.    8. Colon cancer screening  Ordered for after first of the year.  - ABSTRACT COLOGUARD-NO CHARGE    9. Breast asymmetry in female  Chronic; with back pains and dermatitis - discussed possible plastics referral for reconstruction.      Relevant cancer screening discussed.    Counseled on healthy diet, Calcium and vitamin D intake, and exercise.    Irma Torres, DO  Family Practice

## 2020-07-31 ENCOUNTER — OFFICE VISIT (OUTPATIENT)
Dept: FAMILY MEDICINE | Facility: OTHER | Age: 50
End: 2020-07-31
Attending: FAMILY MEDICINE
Payer: COMMERCIAL

## 2020-07-31 VITALS
WEIGHT: 186.5 LBS | DIASTOLIC BLOOD PRESSURE: 78 MMHG | RESPIRATION RATE: 16 BRPM | BODY MASS INDEX: 31.84 KG/M2 | HEART RATE: 72 BPM | SYSTOLIC BLOOD PRESSURE: 128 MMHG | TEMPERATURE: 97.4 F | HEIGHT: 64 IN

## 2020-07-31 DIAGNOSIS — R45.86 LABILE MOOD: ICD-10-CM

## 2020-07-31 DIAGNOSIS — Z12.31 VISIT FOR SCREENING MAMMOGRAM: ICD-10-CM

## 2020-07-31 DIAGNOSIS — Z00.00 ROUTINE HISTORY AND PHYSICAL EXAMINATION OF ADULT: Primary | ICD-10-CM

## 2020-07-31 DIAGNOSIS — E11.9 TYPE 2 DIABETES MELLITUS WITHOUT COMPLICATION, WITH LONG-TERM CURRENT USE OF INSULIN (H): ICD-10-CM

## 2020-07-31 DIAGNOSIS — Z12.4 CERVICAL CANCER SCREENING: ICD-10-CM

## 2020-07-31 DIAGNOSIS — N95.1 PERIMENOPAUSAL: ICD-10-CM

## 2020-07-31 DIAGNOSIS — E78.00 HYPERCHOLESTEREMIA: ICD-10-CM

## 2020-07-31 DIAGNOSIS — Z79.4 TYPE 2 DIABETES MELLITUS WITHOUT COMPLICATION, WITH LONG-TERM CURRENT USE OF INSULIN (H): ICD-10-CM

## 2020-07-31 DIAGNOSIS — N64.89 BREAST ASYMMETRY IN FEMALE: ICD-10-CM

## 2020-07-31 DIAGNOSIS — Z12.11 COLON CANCER SCREENING: ICD-10-CM

## 2020-07-31 LAB
ALBUMIN SERPL-MCNC: 4.4 G/DL (ref 3.5–5.7)
ALP SERPL-CCNC: 39 U/L (ref 34–104)
ALT SERPL W P-5'-P-CCNC: 22 U/L (ref 7–52)
ANION GAP SERPL CALCULATED.3IONS-SCNC: 8 MMOL/L (ref 3–14)
AST SERPL W P-5'-P-CCNC: 22 U/L (ref 13–39)
BILIRUB SERPL-MCNC: 0.3 MG/DL (ref 0.3–1)
BUN SERPL-MCNC: 18 MG/DL (ref 7–25)
CALCIUM SERPL-MCNC: 9 MG/DL (ref 8.6–10.3)
CHLORIDE SERPL-SCNC: 104 MMOL/L (ref 98–107)
CHOLEST SERPL-MCNC: 124 MG/DL
CO2 SERPL-SCNC: 26 MMOL/L (ref 21–31)
CREAT SERPL-MCNC: 0.81 MG/DL (ref 0.6–1.2)
GFR SERPL CREATININE-BSD FRML MDRD: 75 ML/MIN/{1.73_M2}
GLUCOSE SERPL-MCNC: 102 MG/DL (ref 70–105)
HBA1C MFR BLD: 7.2 % (ref 4–6)
HDLC SERPL-MCNC: 53 MG/DL (ref 23–92)
LDLC SERPL CALC-MCNC: 57 MG/DL
NONHDLC SERPL-MCNC: 71 MG/DL
POTASSIUM SERPL-SCNC: 3.8 MMOL/L (ref 3.5–5.1)
PROT SERPL-MCNC: 7.6 G/DL (ref 6.4–8.9)
SODIUM SERPL-SCNC: 138 MMOL/L (ref 134–144)
TRIGL SERPL-MCNC: 70 MG/DL

## 2020-07-31 PROCEDURE — 80061 LIPID PANEL: CPT | Mod: ZL | Performed by: FAMILY MEDICINE

## 2020-07-31 PROCEDURE — 99396 PREV VISIT EST AGE 40-64: CPT | Performed by: FAMILY MEDICINE

## 2020-07-31 PROCEDURE — 88142 CYTOPATH C/V THIN LAYER: CPT | Performed by: FAMILY MEDICINE

## 2020-07-31 PROCEDURE — 87624 HPV HI-RISK TYP POOLED RSLT: CPT | Mod: ZL | Performed by: FAMILY MEDICINE

## 2020-07-31 PROCEDURE — 36415 COLL VENOUS BLD VENIPUNCTURE: CPT | Mod: ZL | Performed by: FAMILY MEDICINE

## 2020-07-31 PROCEDURE — 80053 COMPREHEN METABOLIC PANEL: CPT | Mod: ZL | Performed by: FAMILY MEDICINE

## 2020-07-31 PROCEDURE — G0123 SCREEN CERV/VAG THIN LAYER: HCPCS | Performed by: FAMILY MEDICINE

## 2020-07-31 PROCEDURE — 83036 HEMOGLOBIN GLYCOSYLATED A1C: CPT | Mod: ZL | Performed by: FAMILY MEDICINE

## 2020-07-31 RX ORDER — VENLAFAXINE HYDROCHLORIDE 37.5 MG/1
37.5 CAPSULE, EXTENDED RELEASE ORAL DAILY
Qty: 90 CAPSULE | Refills: 1 | Status: SHIPPED | OUTPATIENT
Start: 2020-07-31 | End: 2021-01-25

## 2020-07-31 RX ORDER — GLIMEPIRIDE 4 MG/1
4 TABLET ORAL
Qty: 90 TABLET | Refills: 4 | Status: SHIPPED | OUTPATIENT
Start: 2020-07-31 | End: 2021-08-03

## 2020-07-31 RX ORDER — SIMVASTATIN 20 MG
20 TABLET ORAL AT BEDTIME
Qty: 90 TABLET | Refills: 4 | Status: SHIPPED | OUTPATIENT
Start: 2020-07-31 | End: 2021-08-31

## 2020-07-31 RX ORDER — LISINOPRIL 2.5 MG/1
TABLET ORAL
Qty: 180 TABLET | Refills: 4 | Status: SHIPPED | OUTPATIENT
Start: 2020-07-31 | End: 2021-09-13

## 2020-07-31 ASSESSMENT — MIFFLIN-ST. JEOR: SCORE: 1451.99

## 2020-07-31 ASSESSMENT — PAIN SCALES - GENERAL: PAINLEVEL: NO PAIN (0)

## 2020-07-31 NOTE — LETTER
August 10, 2020      Geronimo Vallejo  [address] [address]        Dear ,    We are writing to inform you of your test results.    Your test results fall within the expected range(s) or remain unchanged from previous results.  Please continue with current treatment plan.    Resulted Orders   HPV High Risk Types DNA Cervical   Result Value Ref Range    HPV Source ThinPrep       Comment:      CORRECTED ON 08/07 AT 0823: PREVIOUSLY REPORTED AS [facility]    HPV 16 DNA Negative NEG^Negative    HPV 18 DNA Negative NEG^Negative    Other HR HPV Negative NEG^Negative    Final Diagnosis This patient's sample is negative for HPV DNA.       Comment:      (Note)  METHODOLOGY:  The Roche kary 4800 system uses automated extraction,   simultaneous amplification of HPV (L1 region) and beta-globin,    followed by  real time detection of fluorescent labeled HPV and beta   globin using specific oligonucleotide probes . The test specifically   identifies types HPV 16 DNA and HPV 18 DNA while concurrently   detecting the rest of the high risk types (31, 33, 35, 39, 45, 51,   52, 56, 58, 59, 66 or 68).  COMMENTS:  This test is not intended for use as a screening device   for women under age 30 with normal cervical cytology.  Results should   be correlated with cytologic and histologic findings. Close clinical   followup is recommended.      Specimen Description Cervical Cells    Pap Screen Thin Prep with HPV - recommended age 30 - 65 years (select HPV order below)   Result Value Ref Range    PAP NIL     Copath Report         Patient Name: GERONIMO VALLEJO  MR#: [id number]  Specimen #: [id number]  Collected: 7/31/2020  Received: 8/3/2020  Reported: 8/4/2020 09:49  Ordering Phy(s): TING BARROW    For improved result formatting, select 'View Enhanced Report Format' under   Linked Documents section.    SPECIMEN/STAIN PROCESS:  Thin Prep Pap Screen - GICH (ThinPrep)       Pap Stain (GICH) x 1, HPV ordered x 1    SOURCE:  Cervical, endocervical  ----------------------------------------------------------------   Thin Prep Pap Screen - GICH (ThinPrep)  SPECIMEN ADEQUACY:  Satisfactory for evaluation.  -Transformation zone component present.    CYTOLOGIC INTERPRETATION:    Negative for intraepithelial lesion or malignancy    Electronically signed out by:  MOISÉS Pinzon (ASCP)    Processed and screened at [facility]    CLINICAL HISTORY:  LMP: n/a  Previous normal pap  Date of Last Pap: 02/09/2016,    Papanicolaou Test Limitations:  Cervical cytology is a screening test with   limite d sensitivity; regular  screening is critical for cancer prevention; Pap tests are primarily   effective for the diagnosis/prevention of  squamous cell carcinoma, not adenocarcinomas or other cancers.    TESTING LAB LOCATION:  [facility]  [address] [place] [address] [phone]    COLLECTION SITE:  Client:  [facility]  Location: [facility] ()       Hemoglobin A1c   Result Value Ref Range    Hemoglobin A1C 7.2 (H) 4.0 - 6.0 %   Lipid Panel   Result Value Ref Range    Cholesterol 124 <200 mg/dL    Triglycerides 70 <150 mg/dL    HDL Cholesterol 53 23 - 92 mg/dL    LDL Cholesterol Calculated 57 <100 mg/dL      Comment:      Desirable:       <100 mg/dl    Non HDL Cholesterol 71 <130 mg/dL   Comprehensive Metabolic Panel   Result Value Ref Range    Sodium 138 134 - 144 mmol/L    Potassium 3.8 3.5 - 5.1 mmol/L    Chloride 104 98 - 107 mmol/L    Carbon Dioxide 26 21 - 31 mmol/L    Anion Gap 8 3 - 14 mmol/L    Glucose 102 70 - 105 mg/dL    Urea Nitrogen 18 7 - 25 mg/dL    Creatinine 0.81 0.60 - 1.20 mg/dL    GFR Estimate 75 >60 mL/min/[1.73_m2]    GFR Estimate If Black >90 >60 mL/min/[1.73_m2]    Calcium 9.0 8.6 - 10.3 mg/dL    Bilirubin Total 0.3 0.3 - 1.0 mg/dL    Albumin 4.4 3.5 - 5.7 g/dL    Protein Total 7.6 6.4 - 8.9 g/dL    Alkaline Phosphatase 39 34 - 104 U/L    ALT 22  7 - 52 U/L    AST 22 13 - 39 U/L       If you have any questions or concerns, please call the clinic at the number listed above.       Sincerely,        Irma Torres, DO

## 2020-07-31 NOTE — NURSING NOTE
"Chief Complaint   Patient presents with     Physical       Initial /78   Pulse 72   Temp 97.4  F (36.3  C) (Tympanic)   Resp 16   Ht 1.619 m (5' 3.75\")   Wt 84.6 kg (186 lb 8 oz)   LMP 07/15/2020 (Exact Date)   BMI 32.26 kg/m   Estimated body mass index is 32.26 kg/m  as calculated from the following:    Height as of this encounter: 1.619 m (5' 3.75\").    Weight as of this encounter: 84.6 kg (186 lb 8 oz).  Medication Reconciliation: complete    Libby Ford LPN     Previous A1C is not at goal of <8  Lab Results   Component Value Date    A1C 8.7 06/20/2019    A1C 7.6 09/21/2018    A1C 9.1 04/11/2018     Urine microalbumin:creatine: 4/11/18  Foot exam 6/20/19  Eye exam appointment scheduled for 8/07/2020    Tobacco User no  Patient is on a daily aspirin  Patient is on a Statin.  Blood pressure today of:     BP Readings from Last 1 Encounters:   07/31/20 128/78      is at the goal of <139/89 for diabetics.    Libby Ford LPN on 7/31/2020 at 8:07 AM      "

## 2020-08-04 LAB
COPATH REPORT: NORMAL
PAP: NORMAL

## 2020-08-10 LAB
FINAL DIAGNOSIS: NORMAL
HPV HR 12 DNA CVX QL NAA+PROBE: NEGATIVE
HPV16 DNA SPEC QL NAA+PROBE: NEGATIVE
HPV18 DNA SPEC QL NAA+PROBE: NEGATIVE
SPECIMEN DESCRIPTION: NORMAL
SPECIMEN SOURCE CVX/VAG CYTO: NORMAL

## 2020-10-14 DIAGNOSIS — Z79.4 TYPE 2 DIABETES MELLITUS WITHOUT COMPLICATION, WITH LONG-TERM CURRENT USE OF INSULIN (H): ICD-10-CM

## 2020-10-14 DIAGNOSIS — E11.9 TYPE 2 DIABETES MELLITUS WITHOUT COMPLICATION, WITH LONG-TERM CURRENT USE OF INSULIN (H): ICD-10-CM

## 2020-10-14 RX ORDER — FLURBIPROFEN SODIUM 0.3 MG/ML
SOLUTION/ DROPS OPHTHALMIC
Qty: 100 EACH | Refills: 3 | Status: SHIPPED | OUTPATIENT
Start: 2020-10-14 | End: 2022-01-10

## 2020-10-14 NOTE — TELEPHONE ENCOUNTER
[facility] the [facility]:   B-D U/F insulin pen needle  Sig: ONCE DAILY    Last Prescription Date:   06/20/2019  Last Fill Qty/Refills:         100 each, R-4    Last Office Visit:              07/31/2020 (Melissa)   Future Office visit:           None noted   Diabetic Supplies Protocol Passed - 10/14/2020 11:15 AM        Passed - Medication is active on med list        Passed - Patient is 18 years of age or older        Passed - Recent (6 mo) or future (30 days) visit within the authorizing provider's specialty     Patient had office visit in the last 6 months or has a visit in the next 30 days with authorizing provider.  See \"Patient Info\" tab in inbasket, or \"Choose Columns\" in Meds & Orders section of the refill encounter.               Prescription approved per [facility] Refill Protocol.  Wendy Rodríguez RN ....................  10/14/2020   2:58 PM      "

## 2020-12-27 ENCOUNTER — HEALTH MAINTENANCE LETTER (OUTPATIENT)
Age: 50
End: 2020-12-27

## 2021-01-25 DIAGNOSIS — N95.1 PERIMENOPAUSAL: ICD-10-CM

## 2021-01-25 DIAGNOSIS — R45.86 LABILE MOOD: ICD-10-CM

## 2021-01-25 RX ORDER — VENLAFAXINE HYDROCHLORIDE 37.5 MG/1
37.5 CAPSULE, EXTENDED RELEASE ORAL DAILY
Qty: 90 CAPSULE | Refills: 0 | Status: SHIPPED | OUTPATIENT
Start: 2021-01-25 | End: 2021-02-05

## 2021-01-25 NOTE — TELEPHONE ENCOUNTER
Routing refill request to provider for review/approval because:  Patient needs to be seen because:  Per LOV 7/31/2020  Patient was to follow up in  4-6 weeks    LVO; 7/31/2020  Called and informed patient due for follow up and patient at work right now but will call back to schedule follow up .  Informed patient will route request to Dr. Torres for review and consideration of refill until appointment.  Cari Tarango RN on 1/25/2021 at 4:16 PM

## 2021-02-05 ENCOUNTER — OFFICE VISIT (OUTPATIENT)
Dept: FAMILY MEDICINE | Facility: OTHER | Age: 51
End: 2021-02-05
Attending: FAMILY MEDICINE
Payer: COMMERCIAL

## 2021-02-05 VITALS
OXYGEN SATURATION: 100 % | SYSTOLIC BLOOD PRESSURE: 124 MMHG | WEIGHT: 193.38 LBS | TEMPERATURE: 97.2 F | RESPIRATION RATE: 16 BRPM | HEART RATE: 79 BPM | BODY MASS INDEX: 33.45 KG/M2 | DIASTOLIC BLOOD PRESSURE: 78 MMHG

## 2021-02-05 DIAGNOSIS — E11.9 TYPE 2 DIABETES MELLITUS WITHOUT COMPLICATION, WITH LONG-TERM CURRENT USE OF INSULIN (H): Primary | ICD-10-CM

## 2021-02-05 DIAGNOSIS — Z11.4 SCREENING FOR HIV WITHOUT PRESENCE OF RISK FACTORS: ICD-10-CM

## 2021-02-05 DIAGNOSIS — Z12.11 COLON CANCER SCREENING: ICD-10-CM

## 2021-02-05 DIAGNOSIS — Z12.31 VISIT FOR SCREENING MAMMOGRAM: ICD-10-CM

## 2021-02-05 DIAGNOSIS — R45.86 LABILE MOOD: ICD-10-CM

## 2021-02-05 DIAGNOSIS — Z11.59 ENCOUNTER FOR HEPATITIS C SCREENING TEST FOR LOW RISK PATIENT: ICD-10-CM

## 2021-02-05 DIAGNOSIS — F41.1 ANXIETY STATE: ICD-10-CM

## 2021-02-05 DIAGNOSIS — N95.1 PERIMENOPAUSAL: ICD-10-CM

## 2021-02-05 DIAGNOSIS — Z79.4 TYPE 2 DIABETES MELLITUS WITHOUT COMPLICATION, WITH LONG-TERM CURRENT USE OF INSULIN (H): Primary | ICD-10-CM

## 2021-02-05 LAB
ANION GAP SERPL CALCULATED.3IONS-SCNC: 7 MMOL/L (ref 3–14)
BUN SERPL-MCNC: 18 MG/DL (ref 7–25)
CALCIUM SERPL-MCNC: 10 MG/DL (ref 8.6–10.3)
CHLORIDE SERPL-SCNC: 97 MMOL/L (ref 98–107)
CO2 SERPL-SCNC: 29 MMOL/L (ref 21–31)
CREAT SERPL-MCNC: 0.74 MG/DL (ref 0.6–1.2)
CREAT UR-MCNC: 34 MG/DL
GFR SERPL CREATININE-BSD FRML MDRD: 83 ML/MIN/{1.73_M2}
GLUCOSE SERPL-MCNC: 217 MG/DL (ref 70–105)
HBA1C MFR BLD: 8.2 % (ref 4–6)
MICROALBUMIN UR-MCNC: <5 MG/L
MICROALBUMIN/CREAT UR: NORMAL MG/G CR (ref 0–25)
POTASSIUM SERPL-SCNC: 3.9 MMOL/L (ref 3.5–5.1)
SODIUM SERPL-SCNC: 133 MMOL/L (ref 134–144)
T4 FREE SERPL-MCNC: 0.82 NG/DL (ref 0.6–1.6)
TSH SERPL DL<=0.05 MIU/L-ACNC: 2.62 IU/ML (ref 0.34–5.6)

## 2021-02-05 PROCEDURE — 80048 BASIC METABOLIC PNL TOTAL CA: CPT | Mod: ZL | Performed by: FAMILY MEDICINE

## 2021-02-05 PROCEDURE — 99214 OFFICE O/P EST MOD 30 MIN: CPT | Performed by: FAMILY MEDICINE

## 2021-02-05 PROCEDURE — 84443 ASSAY THYROID STIM HORMONE: CPT | Mod: ZL | Performed by: FAMILY MEDICINE

## 2021-02-05 PROCEDURE — 84439 ASSAY OF FREE THYROXINE: CPT | Mod: ZL | Performed by: FAMILY MEDICINE

## 2021-02-05 PROCEDURE — 86803 HEPATITIS C AB TEST: CPT | Mod: ZL | Performed by: FAMILY MEDICINE

## 2021-02-05 PROCEDURE — 36415 COLL VENOUS BLD VENIPUNCTURE: CPT | Mod: ZL | Performed by: FAMILY MEDICINE

## 2021-02-05 PROCEDURE — 87389 HIV-1 AG W/HIV-1&-2 AB AG IA: CPT | Mod: ZL | Performed by: FAMILY MEDICINE

## 2021-02-05 PROCEDURE — 83036 HEMOGLOBIN GLYCOSYLATED A1C: CPT | Mod: ZL | Performed by: FAMILY MEDICINE

## 2021-02-05 PROCEDURE — 82043 UR ALBUMIN QUANTITATIVE: CPT | Mod: ZL | Performed by: FAMILY MEDICINE

## 2021-02-05 RX ORDER — VENLAFAXINE HYDROCHLORIDE 37.5 MG/1
37.5 CAPSULE, EXTENDED RELEASE ORAL DAILY
Qty: 90 CAPSULE | Refills: 4 | Status: SHIPPED | OUTPATIENT
Start: 2021-02-05 | End: 2022-04-29

## 2021-02-05 ASSESSMENT — PAIN SCALES - GENERAL: PAINLEVEL: NO PAIN (0)

## 2021-02-05 ASSESSMENT — ANXIETY QUESTIONNAIRES
GAD7 TOTAL SCORE: 0
3. WORRYING TOO MUCH ABOUT DIFFERENT THINGS: NOT AT ALL
6. BECOMING EASILY ANNOYED OR IRRITABLE: NOT AT ALL
5. BEING SO RESTLESS THAT IT IS HARD TO SIT STILL: NOT AT ALL
1. FEELING NERVOUS, ANXIOUS, OR ON EDGE: NOT AT ALL
IF YOU CHECKED OFF ANY PROBLEMS ON THIS QUESTIONNAIRE, HOW DIFFICULT HAVE THESE PROBLEMS MADE IT FOR YOU TO DO YOUR WORK, TAKE CARE OF THINGS AT HOME, OR GET ALONG WITH OTHER PEOPLE: NOT DIFFICULT AT ALL
2. NOT BEING ABLE TO STOP OR CONTROL WORRYING: NOT AT ALL
7. FEELING AFRAID AS IF SOMETHING AWFUL MIGHT HAPPEN: NOT AT ALL

## 2021-02-05 ASSESSMENT — PATIENT HEALTH QUESTIONNAIRE - PHQ9: 5. POOR APPETITE OR OVEREATING: NOT AT ALL

## 2021-02-05 NOTE — PROGRESS NOTES
SUBJECTIVE:   Selena Bingham is a 50 year old female who presents to clinic today for the following health issues:    HPI  Selena was seen last summer for her physical and started on venlafaxine for irritability/labile mood related to menopausal changes.    Since starting the mediation she has noticed: significant improvement. Only very fleeting episodes of anger/irritability.  Desires to stay on same dose.    PHQ 2/9/2016 4/23/2018 6/20/2019   PHQ-9 Total Score 1 0 0   Q9: Thoughts of better off dead/self-harm past 2 weeks Not at all Not at all Not at all     MARCELA-7 SCORE 1/29/2015 6/20/2019 2/5/2021   Total Score 3 0 0     She is also here for DM check:  AM: 130-190; rare to be higher than 150.  Lunch: 130-150  Supper: 130-150  HS: 130-150  No symptoms of neuropathy.  Has had 1-2 readings in the 70s.  Gets some numbness/tingling of fingers, dizzy sensation.   Eye exams yearly with Dr. Pettit.  Remains on: Lantus 60u at bedtime, Metformin 1000mg BID, glimepiride 4mg AM.  Is tolerating statin, ACEI, bASA well.  Working on getting the second dose of metformin more consistent; as she can miss this.  Does notice a big difference.    Has also been dealing with cysts within her mouth/and bony loss.  Recently had procedure done with [facility].  Notices sugars are higher at times of her surgery or when being treated with abx.    Patient Active Problem List    Diagnosis Date Noted     Breast mass, right 02/09/2018     Priority: Medium     Overview:   enlarged       Type 2 diabetes mellitus without complication, without long-term current use of insulin (H) 02/09/2018     Priority: Medium     Hypercholesterolemia 01/09/2014     Priority: Medium     Other abnormal Papanicolaou smear of cervix and cervical HPV(795.09) 11/28/2011     Priority: Medium     Overview:   endometrial cells on otherwise negative pap smear in woman age 40 or over       Anxiety state 03/09/2009     Priority: Medium     Past Medical History:  "  Diagnosis Date     Type 2 diabetes mellitus without complications (H)     No Comments Provided     Urinary tract infection     UTI 12/8/12     Varicella without complication     Chickenpox      Past Surgical History:   Procedure Laterality Date     OTHER SURGICAL HISTORY      [id number],NO PREVIOUS SURGERY     Family History   Problem Relation Age of Onset     Diabetes Mother         Diabetes,well controlled     Hypertension Mother         Hypertension     Unknown/Adopted Father         Unknown     Family History Negative Brother         Good Health,unsure     Breast Cancer No family hx of         Cancer-breast     Social History     Tobacco Use     Smoking status: Never Smoker     Smokeless tobacco: Never Used   Substance Use Topics     Alcohol use: Yes     Alcohol/week: 0.0 standard drinks     Comment: occasional     Social History     Social History Narrative    Works as a  \"\" @ [employer].  She is not .  + Boyfriend.     Current Outpatient Medications   Medication Sig Dispense Refill     venlafaxine (EFFEXOR-XR) 37.5 MG 24 hr capsule Take 1 capsule (37.5 mg) by mouth daily 90 capsule 4     aspirin (ASPIRIN LOW DOSE) 81 MG tablet Take by mouth daily 30 tablet      B-D U/F insulin pen needle ONCE DAILY 100 each 3     blood glucose (NO BRAND SPECIFIED) lancets standard Use to test blood sugar 3 times daily or as directed.Dispense item as covered by patient's insurance 300 each 4     blood glucose (NO BRAND SPECIFIED) test strip Use to test blood sugars 3 times daily or as directed Dispense item as covered by patient's insurance 300 strip 4     blood glucose monitoring (NO BRAND SPECIFIED) meter device kit Use to test blood sugar 3 times daily or as directed.Dispense item as covered by patient's insurance 1 kit 0     blood glucose monitoring (SOFTCLIX) lancets TEST TID       Calcium Carb-Cholecalciferol (CALCIUM 600 + D) 600-200 MG-UNIT TABS Take 1 tablet by mouth daily       " glimepiride (AMARYL) 4 MG tablet Take 1 tablet (4 mg) by mouth every morning (before breakfast) 90 tablet 4     insulin glargine (LANTUS PEN) 100 UNIT/ML pen Inject 60 Units Subcutaneous At Bedtime 60 mL 4     lisinopril (ZESTRIL) 2.5 MG tablet TAKE 2 TABLETS BY MOUTH EVERY  tablet 4     metFORMIN (GLUCOPHAGE) 1000 MG tablet TAKE 1 TABLET BY MOUTH TWICE DAILY WITH MEALS 180 tablet 4     Multiple Vitamin (MULTI-VITAMINS) TABS Take 1 tablet by mouth daily       simvastatin (ZOCOR) 20 MG tablet Take 1 tablet (20 mg) by mouth At Bedtime 90 tablet 4     No Known Allergies    OBJECTIVE:     /78   Pulse 79   Temp 97.2  F (36.2  C) (Tympanic)   Resp 16   Wt 87.7 kg (193 lb 6 oz)   LMP 01/17/2021 (Approximate)   SpO2 100%   BMI 33.45 kg/m    Body mass index is 33.45 kg/m .  Physical Exam  Vitals signs and nursing note reviewed.   Constitutional:       Appearance: Normal appearance.   Cardiovascular:      Rate and Rhythm: Normal rate and regular rhythm.      Pulses: Normal pulses.   Pulmonary:      Effort: Pulmonary effort is normal.   Skin:     Capillary Refill: Capillary refill takes less than 2 seconds.   Neurological:      General: No focal deficit present.      Mental Status: She is alert.   Psychiatric:         Mood and Affect: Mood normal.         Behavior: Behavior normal.       Diagnostic Test Results:  Results for orders placed or performed in visit on 02/05/21   Basic Metabolic Panel     Status: Abnormal   Result Value Ref Range    Sodium 133 (L) 134 - 144 mmol/L    Potassium 3.9 3.5 - 5.1 mmol/L    Chloride 97 (L) 98 - 107 mmol/L    Carbon Dioxide 29 21 - 31 mmol/L    Anion Gap 7 3 - 14 mmol/L    Glucose 217 (H) 70 - 105 mg/dL    Urea Nitrogen 18 7 - 25 mg/dL    Creatinine 0.74 0.60 - 1.20 mg/dL    GFR Estimate 83 >60 mL/min/[1.73_m2]    GFR Estimate If Black >90 >60 mL/min/[1.73_m2]    Calcium 10.0 8.6 - 10.3 mg/dL   Hemoglobin A1c     Status: Abnormal   Result Value Ref Range    Hemoglobin  A1C 8.2 (H) 4.0 - 6.0 %       ASSESSMENT/PLAN:       1. Type 2 diabetes mellitus without complication, with long-term current use of insulin (H)  Likely stable status.  Could have more strict control with Metformin, and will continue to work on this.  Labs today.    - Hemoglobin A1c; Future  - Basic Metabolic Panel; Future  - Albumin Random Urine Quantitative with Creat Ratio; Future  - Albumin Random Urine Quantitative with Creat Ratio  - Basic Metabolic Panel  - Hemoglobin A1c    2. Labile mood  Improved significantly with effexor.  Continue on same dose x 1 year; evaluate intermittently for change/reduction.  - TSH Reflex GH; Future  - T4, Free; Future  - venlafaxine (EFFEXOR-XR) 37.5 MG 24 hr capsule; Take 1 capsule (37.5 mg) by mouth daily  Dispense: 90 capsule; Refill: 4  - T4, Free  - TSH Reflex GH    3. Anxiety state  Add TSH as she has not had this monitored in the past; although symptoms improved with venlafaxine.  Less likely to be thyroid driven mood change.  - TSH Reflex GH; Future  - T4, Free; Future  - T4, Free  - TSH Reflex GH    4. Perimenopausal  See #2.  - venlafaxine (EFFEXOR-XR) 37.5 MG 24 hr capsule; Take 1 capsule (37.5 mg) by mouth daily  Dispense: 90 capsule; Refill: 4    5. Screening for HIV without presence of risk factors  Added to labs collected today.  - HIV Antigen Antibody Combo; Future  - HIV Antigen Antibody Combo    6. Encounter for hepatitis C screening test for low risk patient  Added to labs collected today.  - Hepatitis C Screen Reflex to HCV RNA Quant and Genotype; Future  - Hepatitis C Screen Reflex to HCV RNA Quant and Genotype    7. Visit for screening mammogram  Ordered for future physical this summer.  - MA Screen Bilateral w/James; Future    8. Colon cancer screening  Ordered for future.  - COLOGSERGEY(EXACT SCIENCES)      Irma Torres, [facility] AND [facility]

## 2021-02-05 NOTE — NURSING NOTE
"Chief Complaint   Patient presents with     Recheck Medication       Initial /78   Pulse 79   Temp 97.2  F (36.2  C) (Tympanic)   Resp 16   Wt 87.7 kg (193 lb 6 oz)   LMP 01/17/2021 (Approximate)   SpO2 100%   BMI 33.45 kg/m   Estimated body mass index is 33.45 kg/m  as calculated from the following:    Height as of 7/31/20: 1.619 m (5' 3.75\").    Weight as of this encounter: 87.7 kg (193 lb 6 oz).  Medication Reconciliation: complete    Libby Ford LPN     Previous A1C is at goal of <8  Lab Results   Component Value Date    A1C 7.2 07/31/2020    A1C 8.7 06/20/2019    A1C 7.6 09/21/2018    A1C 9.1 04/11/2018     Urine microalbumin:creatine: 4/11/18  Foot exam due  Eye exam 8/2020    Tobacco User no  Patient is on a daily aspirin  Patient is on a Statin.  Blood pressure today of:     BP Readings from Last 1 Encounters:   02/05/21 124/78      is at the goal of <139/89 for diabetics.    Libby Ford LPN on 2/5/2021 at 11:21 AM        "

## 2021-02-06 ASSESSMENT — ANXIETY QUESTIONNAIRES: GAD7 TOTAL SCORE: 0

## 2021-02-08 LAB
HCV AB SERPL QL IA: NONREACTIVE
HIV 1+2 AB+HIV1 P24 AG SERPL QL IA: NONREACTIVE

## 2021-03-06 ENCOUNTER — HEALTH MAINTENANCE LETTER (OUTPATIENT)
Age: 51
End: 2021-03-06

## 2021-06-12 NOTE — TELEPHONE ENCOUNTER
[facility]      Facility:    [facility] [[id number]]  [facility] skilled nursing facility.  Metformin    Last Office Visit: 02/10/2017  Future Office visit:     Due for exam.  Limited refill per protocol and letter mailed.  Maryan Lyn ........   2/26/2018    3:47 PM       Joaquin To MD;  Location: Phelps Memorial Sunil Collins MD

## 2021-06-18 ENCOUNTER — IMMUNIZATION (OUTPATIENT)
Dept: FAMILY MEDICINE | Facility: OTHER | Age: 51
End: 2021-06-18
Attending: FAMILY MEDICINE
Payer: COMMERCIAL

## 2021-06-18 PROCEDURE — 0001A PR COVID VAC PFIZER DIL RECON 30 MCG/0.3 ML IM: CPT

## 2021-06-18 PROCEDURE — 91300 PR COVID VAC PFIZER DIL RECON 30 MCG/0.3 ML IM: CPT

## 2021-07-08 DIAGNOSIS — Z79.4 TYPE 2 DIABETES MELLITUS WITHOUT COMPLICATION, WITH LONG-TERM CURRENT USE OF INSULIN (H): ICD-10-CM

## 2021-07-08 DIAGNOSIS — E11.9 TYPE 2 DIABETES MELLITUS WITHOUT COMPLICATION, WITH LONG-TERM CURRENT USE OF INSULIN (H): ICD-10-CM

## 2021-07-08 RX ORDER — BLOOD SUGAR DIAGNOSTIC
STRIP MISCELLANEOUS
Qty: 300 STRIP | Refills: 2 | Status: SHIPPED | OUTPATIENT
Start: 2021-07-08 | End: 2022-09-15

## 2021-07-08 NOTE — TELEPHONE ENCOUNTER
"[facility] sent Rx request for the following:     Requested Prescriptions   Pending Prescriptions Disp Refills     blood glucose (ACCU-CHEK SIERRA PLUS) test strip [Pharmacy Med Name: ACCU-CHEK SIERRA PLUS STRIPS 100S] 300 strip 4     Sig: TEST THREE TIMES DAILY     Last Prescription Date:   6/20/2019  Last Fill Qty/Refills:         300, R-4    Last Office Visit:              2/5/2021  Future Office visit:           none        Diabetic Supplies Protocol Passed - 7/8/2021  6:35 AM        Passed - Medication is active on med list        Passed - Patient is 18 years of age or older        Passed - Recent (6 mo) or future (30 days) visit within the authorizing provider's specialty     Patient had office visit in the last 6 months or has a visit in the next 30 days with authorizing provider.  See \"Patient Info\" tab in inbasket, or \"Choose Columns\" in Meds & Orders section of the refill encounter.               Prescription approved per [facility] Refill Protocol.  Karmen Taylor RN ,....................  7/8/2021   10:36 AM    "

## 2021-07-09 ENCOUNTER — IMMUNIZATION (OUTPATIENT)
Dept: FAMILY MEDICINE | Facility: OTHER | Age: 51
End: 2021-07-09
Attending: FAMILY MEDICINE
Payer: COMMERCIAL

## 2021-07-09 PROCEDURE — 0002A PR COVID VAC PFIZER DIL RECON 30 MCG/0.3 ML IM: CPT

## 2021-07-09 PROCEDURE — 91300 PR COVID VAC PFIZER DIL RECON 30 MCG/0.3 ML IM: CPT

## 2021-07-31 DIAGNOSIS — Z79.4 TYPE 2 DIABETES MELLITUS WITHOUT COMPLICATION, WITH LONG-TERM CURRENT USE OF INSULIN (H): ICD-10-CM

## 2021-07-31 DIAGNOSIS — E11.9 TYPE 2 DIABETES MELLITUS WITHOUT COMPLICATION, WITH LONG-TERM CURRENT USE OF INSULIN (H): ICD-10-CM

## 2021-08-03 RX ORDER — GLIMEPIRIDE 4 MG/1
TABLET ORAL
Qty: 90 TABLET | Refills: 4 | Status: SHIPPED | OUTPATIENT
Start: 2021-08-03 | End: 2021-12-01

## 2021-08-03 NOTE — TELEPHONE ENCOUNTER
[facility] sent Rx request for the following:     Requested Prescriptions   Pending Prescriptions Disp Refills     glimepiride (AMARYL) 4 MG tablet [Pharmacy Med Name: GLIMEPIRIDE 4MG TABLETS] 90 tablet 4     Sig: TAKE 1 TABLET(4 MG) BY MOUTH EVERY MORNING BEFORE BREAKFAST     Last Prescription Date:   7/31/2020  Last Fill Qty/Refills:         90, R-4    Last Office Visit:              2/5/2021  Future Office visit:           none    Routing refill request to provider for review/approval because:        Sulfonylurea Agents Failed - 7/31/2021 12:25 PM        Failed - Patient has documented A1c within the specified period of time.     If HgbA1C is 8 or greater, it needs to be on file within the past 3 months.  If less than 8, must be on file within the past 6 months.     Recent Labs   Lab Test 02/05/21  1136 02/10/17  1402   A1C 8.2*  --    [id number]  --  9.1*                 metFORMIN (GLUCOPHAGE) 1000 MG tablet [Pharmacy Med Name: METFORMIN 1000MG TABLETS] 180 tablet 4     Sig: TAKE 1 TABLET BY MOUTH TWICE DAILY WITH MEALS     Last Prescription Date:   7/31/2020  Last Fill Qty/Refills:         180, R-4    Routing refill request to provider for review/approval because:        Biguanide Agents Failed - 7/31/2021 12:25 PM        Failed - Patient has documented A1c within the specified period of time.     If HgbA1C is 8 or greater, it needs to be on file within the past 3 months.  If less than 8, must be on file within the past 6 months.     Recent Labs   Lab Test 02/05/21  1136 02/10/17  1402   A1C 8.2*  --    [id number]  --  9.1*        Unable to complete prescription refill per RN Medication Refill Policy.................... Karmen Taylor RN ....................  8/3/2021   9:06 AM

## 2021-08-25 DIAGNOSIS — Z79.4 TYPE 2 DIABETES MELLITUS WITHOUT COMPLICATION, WITH LONG-TERM CURRENT USE OF INSULIN (H): ICD-10-CM

## 2021-08-25 DIAGNOSIS — E11.9 TYPE 2 DIABETES MELLITUS WITHOUT COMPLICATION, WITH LONG-TERM CURRENT USE OF INSULIN (H): ICD-10-CM

## 2021-08-26 RX ORDER — INSULIN GLARGINE 100 [IU]/ML
INJECTION, SOLUTION SUBCUTANEOUS
Qty: 60 ML | Refills: 4 | Status: SHIPPED | OUTPATIENT
Start: 2021-08-26 | End: 2022-10-11

## 2021-08-26 NOTE — TELEPHONE ENCOUNTER
"[facility]  sent Rx request for the following:     Requested Prescriptions   Pending Prescriptions Disp Refills     LANTUS SOLOSTAR 100 UNIT/ML soln [Pharmacy Med Name: LANTUS SOLOSTAR PEN INJ 3ML] 60 mL 4     Sig: ADMINISTER 60 UNITS UNDER THE SKIN AT BEDTIME     Last Prescription Date:   7/31/2020  Last Fill Qty/Refills:         60 ml, R-4    Last Office Visit:              2/5/2021   Future Office visit:           none    Routing refill request to provider for review/approval because:        Long Acting Insulin Protocol Failed - 8/25/2021  5:33 PM        Failed - HgbA1C in past 3 or 6 months     If HgbA1C is 8 or greater, it needs to be on file within the past 3 months.  If less than 8, must be on file within the past 6 months.     Recent Labs   Lab Test 02/05/21  1136 02/10/17  1402   A1C 8.2*  --    [id number]  --  9.1*             Failed - Recent (6 mo) or future (30 days) visit within the authorizing provider's specialty     Patient had office visit in the last 6 months or has a visit in the next 30 days with authorizing provider or within the authorizing provider's specialty.  See \"Patient Info\" tab in inbasket, or \"Choose Columns\" in Meds & Orders section of the refill encounter.         Unable to complete prescription refill per RN Medication Refill Policy.................... Karmen Taylor RN ....................  8/26/2021   10:13 AM        "

## 2021-08-30 DIAGNOSIS — E11.9 TYPE 2 DIABETES MELLITUS WITHOUT COMPLICATION, WITH LONG-TERM CURRENT USE OF INSULIN (H): ICD-10-CM

## 2021-08-30 DIAGNOSIS — Z79.4 TYPE 2 DIABETES MELLITUS WITHOUT COMPLICATION, WITH LONG-TERM CURRENT USE OF INSULIN (H): ICD-10-CM

## 2021-08-30 DIAGNOSIS — E78.00 HYPERCHOLESTEREMIA: ICD-10-CM

## 2021-08-30 NOTE — LETTER
August 31, 2021      Selena Bingham  [address] [address]        Dear Selena,           A refill of Simvastatin has been requested by your pharmacy and we noticed that you are overdue for an annual exam.  Your last comprehensive visit with Irma Torres MD was on 7/31/2020.    This refill request has been sent to your provider for consideration at this time.    Your health is very important to us.  Please call the clinic at [phone] to schedule your appointment.    Thank you for choosing [facility] and [facility] for your health care needs.    Sincerely,    Refill RN  [facility]

## 2021-08-31 RX ORDER — SIMVASTATIN 20 MG
TABLET ORAL
Qty: 90 TABLET | Refills: 4 | Status: SHIPPED | OUTPATIENT
Start: 2021-08-31 | End: 2022-11-22

## 2021-08-31 NOTE — TELEPHONE ENCOUNTER
[facility]  sent Rx request for the following:     Requested Prescriptions   Pending Prescriptions Disp Refills     simvastatin (ZOCOR) 20 MG tablet [Pharmacy Med Name: SIMVASTATIN 20MG TABLETS] 90 tablet 4     Sig: TAKE 1 TABLET(20 MG) BY MOUTH AT BEDTIME     Last Prescription Date:   7/31/2020  Last Fill Qty/Refills:         90, R-4    Last Office Visit:              2/5/2021  Future Office visit:           none    Routing refill request to provider for review/approval because:  Letter sent to patient to schedule annual wellness.   Routing for 90 day consideration.         Statins Protocol Failed - 8/30/2021  4:41 PM        Failed - LDL on file in past 12 months     Recent Labs   Lab Test 07/31/20  0856   LDL 57        Unable to complete prescription refill per RN Medication Refill Policy.................... Karmen Taylor RN ....................  8/31/2021   9:33 AM

## 2021-09-11 DIAGNOSIS — Z79.4 TYPE 2 DIABETES MELLITUS WITHOUT COMPLICATION, WITH LONG-TERM CURRENT USE OF INSULIN (H): ICD-10-CM

## 2021-09-11 DIAGNOSIS — E11.9 TYPE 2 DIABETES MELLITUS WITHOUT COMPLICATION, WITH LONG-TERM CURRENT USE OF INSULIN (H): ICD-10-CM

## 2021-09-13 RX ORDER — LISINOPRIL 2.5 MG/1
TABLET ORAL
Qty: 180 TABLET | Refills: 4 | Status: SHIPPED | OUTPATIENT
Start: 2021-09-13 | End: 2022-10-14

## 2021-09-25 NOTE — TELEPHONE ENCOUNTER
Dose increase and filled 04/11/18 63 ml x 4. Pharmacy alerted. Unable to complete prescription refill per RNMedication Refill Policy.................... Maryan Lyn ....................  5/3/2018   8:59 AM      BASAGLAR 100 UNIT/ML injection 63 mL 4 4/11/2018  No   Sig: Inject 60 Units Subcutaneous At Bedtime   Class: E-Prescribe   Route: Subcutaneous   Order: [id number]   E-Prescribing Status: Receipt confirmed by pharmacy (4/11/2018 10:17 AM CDT)   Printout Tracking   External Result Report   Pharmacy   [id number] - [place], [place] - [address]       
0 = independent

## 2021-10-26 ENCOUNTER — HOSPITAL ENCOUNTER (OUTPATIENT)
Dept: MAMMOGRAPHY | Facility: OTHER | Age: 51
Discharge: HOME | End: 2021-10-26
Attending: FAMILY MEDICINE | Admitting: FAMILY MEDICINE
Payer: COMMERCIAL

## 2021-10-26 DIAGNOSIS — Z12.31 VISIT FOR SCREENING MAMMOGRAM: ICD-10-CM

## 2021-10-26 PROCEDURE — 77067 SCR MAMMO BI INCL CAD: CPT

## 2021-11-04 ENCOUNTER — TRANSFERRED RECORDS (OUTPATIENT)
Dept: MULTI SPECIALTY CLINIC | Facility: CLINIC | Age: 51
End: 2021-11-04
Payer: COMMERCIAL

## 2021-11-04 LAB — RETINOPATHY: NORMAL

## 2021-11-28 NOTE — PROGRESS NOTES
Assessment & Plan     1. Type 2 diabetes mellitus without complication, with long-term current use of insulin (H)  Chronic, uncontrolled.  Goal is at least less than 8%, but discussed overall goal is closer to 7%.  Will plan to discontinue glimepiride; and start victoza.  0.6mg daily x 28 days; then increase to 1.2mg/day.  Follow up in 3 months with repeat Hgb A1c.  - Hemoglobin A1c; Future  - Basic Metabolic Panel; Future  - Basic Metabolic Panel  - Hemoglobin A1c    2. Hypercholesteremia  Chronic; added to labs today.  - Lipid Panel; Future  - Lipid Panel    3. Anxiety state  Chronic, stable.  Will continue at current effexor at this time.  Had episode of decreased mood for a period of time - but has improved as she adjusted to time change, seasonal, etc.  Aware we could increase to Effexor 75mg daily if symptoms continue to worsen.  - FSH; Future  - Estradiol; Future  - TSH; Future  - T4, Free; Future  - T4, Free  - TSH  - Estradiol  - FSH    4. Perimenopausal  Will monitor thyroid and FSH/estradiol levels.  - FSH; Future  - Estradiol; Future  - TSH; Future  - T4, Free; Future  - T4, Free  - TSH  - Estradiol  - FSH    5. Family history of rheumatoid arthritis  Will continue to monitor ESR/CRP levels due to joint symptoms and family history of RA.  - Sedimentation Rate (ESR); Future  - CRP inflammation; Future  - CRP inflammation    Irma Torres, [facility]   Selena is a 51 year old who presents for the following health issues:    HPI     Diabetes Follow-up  How often are you checking your blood sugar? Two-Three times daily  Blood sugar testing frequency justification:  Uncontrolled diabetes, Adjustment of medication(s) and Risk of hypoglycemia with medication(s)  What time of day are you checking your blood sugars (select all that apply)?  Before and after meals  Have you had any blood sugars above 200?  Yes; only a handful.  Have you had any blood sugars below 70?   A few in the 70s.    What symptoms do you notice when your blood sugar is low?  Shaky/Dizzy, Weak and Lethargy    What concerns do you have today about your diabetes? None     Do you have any of these symptoms? (Select all that apply)  No numbness or tingling in feet.  No redness, sores or blisters on feet.  No complaints of excessive thirst.  No reports of blurry vision.  No significant changes to weight.    Have you had a diabetic eye exam in the last 12 months? Yes- Date of last eye exam: 11/4/21,  Location: Dr. Pettit      BP Readings from Last 2 Encounters:   11/30/21 126/78   02/05/21 124/78     Hemoglobin A1C (%)   Date Value   11/30/2021 8.4 (H)   02/05/2021 8.2 (H)   07/31/2020 7.2 (H)     LDL Cholesterol Calculated (mg/dL)   Date Value   11/30/2021 92   07/31/2020 57   06/20/2019 40     Depression and Anxiety Follow-Up    How are you doing with your depression since your last visit? No change    How are you doing with your anxiety since your last visit?  No change    Are you having other symptoms that might be associated with depression or anxiety? No    Have you had a significant life event? No     Do you have any concerns with your use of alcohol or other drugs? No    Social History     Tobacco Use     Smoking status: Never Smoker     Smokeless tobacco: Never Used   Vaping Use     Vaping Use: Never used   Substance Use Topics     Alcohol use: Yes     Alcohol/week: 0.0 standard drinks     Comment: occasional     Drug use: No     Comment: Drug use: No     PHQ 4/23/2018 6/20/2019 11/30/2021   PHQ-9 Total Score 0 0 0   Q9: Thoughts of better off dead/self-harm past 2 weeks Not at all Not at all Not at all     MARCELA-7 SCORE 6/20/2019 2/5/2021 11/30/2021   Total Score 0 0 0     Review of Systems   CONSTITUTIONAL: NEGATIVE for fever, chills, change in weight  ENT/MOUTH: NEGATIVE for ear, mouth and throat problems  RESP: NEGATIVE for significant cough or SOB  CV: NEGATIVE for chest pain, palpitations or  peripheral edema  MSK: continued joint pains - knee/hands.  Denies stiffness.      Objective    /78   Pulse 72   Temp 97.9  F (36.6  C) (Tympanic)   Resp 20   Wt 87.5 kg (193 lb)   LMP 10/30/2021   SpO2 100%   BMI 33.39 kg/m    Body mass index is 33.39 kg/m .  Physical Exam   GENERAL: healthy, alert and no distress  NECK: no adenopathy, no asymmetry, masses, or scars and thyroid normal to palpation  RESP: lungs clear to auscultation - no rales, rhonchi or wheezes  CV: regular rate and rhythm, normal S1 S2, no S3 or S4, no murmur, click or rub, no peripheral edema and peripheral pulses strong  ABDOMEN: soft, nontender, no hepatosplenomegaly, no masses and bowel sounds normal  MS: no gross musculoskeletal defects noted, no edema  SKIN: no suspicious lesions or rashes  NEURO: Normal strength and tone, mentation intact and speech normal  PSYCH: mentation appears normal, affect normal/bright  Diabetic foot exam: normal DP and PT pulses, no trophic changes or ulcerative lesions and normal sensory exam    Results for orders placed or performed in visit on 11/30/21   Lipid Panel     Status: Abnormal   Result Value Ref Range    Cholesterol 179 <200 mg/dL    Triglycerides 207 (H) <150 mg/dL    Direct Measure HDL 46 23 - 92 mg/dL    LDL Cholesterol Calculated 92 <=100 mg/dL    Non HDL Cholesterol 133 (H) <130 mg/dL    Patient Fasting > 8hrs? Yes     Narrative    Cholesterol  Desirable:  <200 mg/dL    Triglycerides  Normal:  Less than 150 mg/dL  Borderline High:  150-199 mg/dL  High:  200-499 mg/dL  Very High:  Greater than or equal to 500 mg/dL    Direct Measure HDL  Female:  Greater than or equal to 50 mg/dL   Male:  Greater than or equal to 40 mg/dL    LDL Cholesterol  Desirable:  <100mg/dL  Above Desirable:  100-129 mg/dL   Borderline High:  130-159 mg/dL   High:  160-189 mg/dL   Very High:  >= 190 mg/dL    Non HDL Cholesterol  Desirable:  130 mg/dL  Above Desirable:  130-159 mg/dL  Borderline High:  160-189  mg/dL  High:  190-219 mg/dL  Very High:  Greater than or equal to 220 mg/dL   T4, Free     Status: Normal   Result Value Ref Range    Free T4 0.76 0.60 - 1.60 ng/dL   TSH     Status: Normal   Result Value Ref Range    TSH 1.12 0.40 - 4.00 mU/L   Estradiol     Status: None   Result Value Ref Range    Estradiol 51 pg/mL   FSH     Status: None   Result Value Ref Range    FSH 4.4 pg/mL   Basic Metabolic Panel     Status: Abnormal   Result Value Ref Range    Sodium 135 134 - 144 mmol/L    Potassium 4.3 3.5 - 5.1 mmol/L    Chloride 106 98 - 107 mmol/L    Carbon Dioxide (CO2) 22 21 - 31 mmol/L    Anion Gap 7 3 - 14 mmol/L    Urea Nitrogen 15 7 - 25 mg/dL    Creatinine 0.64 0.60 - 1.20 mg/dL    Calcium 9.2 8.6 - 10.3 mg/dL    Glucose 145 (H) 70 - 105 mg/dL    GFR Estimate >90 >60 mL/min/1.73m2   Hemoglobin A1c     Status: Abnormal   Result Value Ref Range    Hemoglobin A1C 8.4 (H) 4.0 - 6.2 %   CRP inflammation     Status: Normal   Result Value Ref Range    CRP Inflammation 1.9 <10.0 mg/L

## 2021-11-30 ENCOUNTER — OFFICE VISIT (OUTPATIENT)
Dept: FAMILY MEDICINE | Facility: OTHER | Age: 51
End: 2021-11-30
Attending: FAMILY MEDICINE
Payer: COMMERCIAL

## 2021-11-30 VITALS
DIASTOLIC BLOOD PRESSURE: 78 MMHG | WEIGHT: 193 LBS | RESPIRATION RATE: 20 BRPM | BODY MASS INDEX: 33.39 KG/M2 | OXYGEN SATURATION: 100 % | HEART RATE: 72 BPM | SYSTOLIC BLOOD PRESSURE: 126 MMHG | TEMPERATURE: 97.9 F

## 2021-11-30 DIAGNOSIS — E78.00 HYPERCHOLESTEREMIA: Primary | ICD-10-CM

## 2021-11-30 DIAGNOSIS — E11.9 TYPE 2 DIABETES MELLITUS WITHOUT COMPLICATION, WITH LONG-TERM CURRENT USE OF INSULIN (H): ICD-10-CM

## 2021-11-30 DIAGNOSIS — F41.1 ANXIETY STATE: ICD-10-CM

## 2021-11-30 DIAGNOSIS — N95.1 PERIMENOPAUSAL: ICD-10-CM

## 2021-11-30 DIAGNOSIS — Z79.4 TYPE 2 DIABETES MELLITUS WITHOUT COMPLICATION, WITH LONG-TERM CURRENT USE OF INSULIN (H): ICD-10-CM

## 2021-11-30 DIAGNOSIS — Z82.61 FAMILY HISTORY OF RHEUMATOID ARTHRITIS: ICD-10-CM

## 2021-11-30 LAB
ANION GAP SERPL CALCULATED.3IONS-SCNC: 7 MMOL/L (ref 3–14)
BUN SERPL-MCNC: 15 MG/DL (ref 7–25)
CALCIUM SERPL-MCNC: 9.2 MG/DL (ref 8.6–10.3)
CHLORIDE BLD-SCNC: 106 MMOL/L (ref 98–107)
CHOLEST SERPL-MCNC: 179 MG/DL
CO2 SERPL-SCNC: 22 MMOL/L (ref 21–31)
CREAT SERPL-MCNC: 0.64 MG/DL (ref 0.6–1.2)
CRP SERPL-MCNC: 1.9 MG/L
ESTRADIOL SERPL-MCNC: 51 PG/ML
FASTING STATUS PATIENT QL REPORTED: YES
FSH SERPL-ACNC: 4.4 PG/ML
GFR SERPL CREATININE-BSD FRML MDRD: >90 ML/MIN/1.73M2
GLUCOSE BLD-MCNC: 145 MG/DL (ref 70–105)
HBA1C MFR BLD: 8.4 % (ref 4–6.2)
HDLC SERPL-MCNC: 46 MG/DL (ref 23–92)
LDLC SERPL CALC-MCNC: 92 MG/DL
NONHDLC SERPL-MCNC: 133 MG/DL
POTASSIUM BLD-SCNC: 4.3 MMOL/L (ref 3.5–5.1)
SODIUM SERPL-SCNC: 135 MMOL/L (ref 134–144)
T4 FREE SERPL-MCNC: 0.76 NG/DL (ref 0.6–1.6)
TRIGL SERPL-MCNC: 207 MG/DL
TSH SERPL DL<=0.005 MIU/L-ACNC: 1.12 MU/L (ref 0.4–4)

## 2021-11-30 PROCEDURE — 80061 LIPID PANEL: CPT | Mod: ZL | Performed by: FAMILY MEDICINE

## 2021-11-30 PROCEDURE — 36415 COLL VENOUS BLD VENIPUNCTURE: CPT | Mod: ZL | Performed by: FAMILY MEDICINE

## 2021-11-30 PROCEDURE — 83036 HEMOGLOBIN GLYCOSYLATED A1C: CPT | Mod: ZL | Performed by: FAMILY MEDICINE

## 2021-11-30 PROCEDURE — 80048 BASIC METABOLIC PNL TOTAL CA: CPT | Mod: ZL | Performed by: FAMILY MEDICINE

## 2021-11-30 PROCEDURE — 82670 ASSAY OF TOTAL ESTRADIOL: CPT | Mod: ZL | Performed by: FAMILY MEDICINE

## 2021-11-30 PROCEDURE — 83001 ASSAY OF GONADOTROPIN (FSH): CPT | Mod: ZL | Performed by: FAMILY MEDICINE

## 2021-11-30 PROCEDURE — 84439 ASSAY OF FREE THYROXINE: CPT | Mod: ZL | Performed by: FAMILY MEDICINE

## 2021-11-30 PROCEDURE — 84443 ASSAY THYROID STIM HORMONE: CPT | Mod: ZL | Performed by: FAMILY MEDICINE

## 2021-11-30 PROCEDURE — 99214 OFFICE O/P EST MOD 30 MIN: CPT | Performed by: FAMILY MEDICINE

## 2021-11-30 PROCEDURE — 86140 C-REACTIVE PROTEIN: CPT | Mod: ZL | Performed by: FAMILY MEDICINE

## 2021-11-30 ASSESSMENT — PATIENT HEALTH QUESTIONNAIRE - PHQ9: 5. POOR APPETITE OR OVEREATING: NOT AT ALL

## 2021-11-30 ASSESSMENT — ANXIETY QUESTIONNAIRES
7. FEELING AFRAID AS IF SOMETHING AWFUL MIGHT HAPPEN: NOT AT ALL
IF YOU CHECKED OFF ANY PROBLEMS ON THIS QUESTIONNAIRE, HOW DIFFICULT HAVE THESE PROBLEMS MADE IT FOR YOU TO DO YOUR WORK, TAKE CARE OF THINGS AT HOME, OR GET ALONG WITH OTHER PEOPLE: NOT DIFFICULT AT ALL
3. WORRYING TOO MUCH ABOUT DIFFERENT THINGS: NOT AT ALL
1. FEELING NERVOUS, ANXIOUS, OR ON EDGE: NOT AT ALL
6. BECOMING EASILY ANNOYED OR IRRITABLE: NOT AT ALL
GAD7 TOTAL SCORE: 0
5. BEING SO RESTLESS THAT IT IS HARD TO SIT STILL: NOT AT ALL
2. NOT BEING ABLE TO STOP OR CONTROL WORRYING: NOT AT ALL

## 2021-11-30 ASSESSMENT — PAIN SCALES - GENERAL: PAINLEVEL: NO PAIN (0)

## 2021-11-30 NOTE — NURSING NOTE
"Chief Complaint   Patient presents with     Diabetes       Initial /78   Pulse 72   Temp 97.9  F (36.6  C) (Tympanic)   Resp 20   Wt 87.5 kg (193 lb)   LMP 10/30/2021   SpO2 100%   BMI 33.39 kg/m   Estimated body mass index is 33.39 kg/m  as calculated from the following:    Height as of 7/31/20: 1.619 m (5' 3.75\").    Weight as of this encounter: 87.5 kg (193 lb).  Medication Reconciliation: complete    Libby Ford LPN     \  Previous A1C is not at goal of <8  Lab Results   Component Value Date    A1C 8.2 02/05/2021    A1C 7.2 07/31/2020    A1C 8.7 06/20/2019    A1C 7.6 09/21/2018    A1C 9.1 04/11/2018     Urine microalbumin:creatine: 2/05/21  Foot exam due  Eye exam 11/2021    Tobacco User no  Patient is on a daily aspirin  Patient is on a Statin.  Blood pressure today of:     BP Readings from Last 1 Encounters:   11/30/21 126/78      is at the goal of <139/89 for diabetics.    Libby Ford LPN on 11/30/2021 at 10:21 AM      "

## 2021-12-01 ENCOUNTER — MYC MEDICAL ADVICE (OUTPATIENT)
Dept: FAMILY MEDICINE | Facility: OTHER | Age: 51
End: 2021-12-01
Payer: COMMERCIAL

## 2021-12-01 RX ORDER — LIRAGLUTIDE 6 MG/ML
INJECTION SUBCUTANEOUS
Qty: 18 ML | Refills: 1 | Status: SHIPPED | OUTPATIENT
Start: 2021-12-01 | End: 2022-06-09

## 2021-12-01 ASSESSMENT — ANXIETY QUESTIONNAIRES: GAD7 TOTAL SCORE: 0

## 2021-12-01 ASSESSMENT — PATIENT HEALTH QUESTIONNAIRE - PHQ9: SUM OF ALL RESPONSES TO PHQ QUESTIONS 1-9: 0

## 2022-01-07 DIAGNOSIS — E11.9 TYPE 2 DIABETES MELLITUS WITHOUT COMPLICATION, WITH LONG-TERM CURRENT USE OF INSULIN (H): ICD-10-CM

## 2022-01-07 DIAGNOSIS — Z79.4 TYPE 2 DIABETES MELLITUS WITHOUT COMPLICATION, WITH LONG-TERM CURRENT USE OF INSULIN (H): ICD-10-CM

## 2022-01-10 RX ORDER — FLURBIPROFEN SODIUM 0.3 MG/ML
SOLUTION/ DROPS OPHTHALMIC
Qty: 100 EACH | Refills: 4 | Status: SHIPPED | OUTPATIENT
Start: 2022-01-10

## 2022-01-10 NOTE — TELEPHONE ENCOUNTER
B-D U/F insulin pen needle     Sig: USE ONCE DAILY           Last Written Prescription Date:  10/14/20  Last Fill Quantity: 100,   # refills: 3  Last Office Visit: 11/30/21  Future Office visit:       Routing refill request to provider for review/approval because:  Drug not on the [facility], [facility] or [facility] refill protocol or controlled substance Olivia Bernal RN on 1/10/2022 at 3:38 PM

## 2022-04-29 DIAGNOSIS — R45.86 LABILE MOOD: ICD-10-CM

## 2022-04-29 DIAGNOSIS — N95.1 PERIMENOPAUSAL: ICD-10-CM

## 2022-04-29 RX ORDER — VENLAFAXINE HYDROCHLORIDE 37.5 MG/1
CAPSULE, EXTENDED RELEASE ORAL
Qty: 90 CAPSULE | Refills: 4 | Status: SHIPPED | OUTPATIENT
Start: 2022-04-29 | End: 2023-06-07

## 2022-04-29 NOTE — TELEPHONE ENCOUNTER
[facility] sent Rx request for the following:      Requested Prescriptions   Pending Prescriptions Disp Refills     venlafaxine (EFFEXOR-XR) 37.5 MG 24 hr capsule [Pharmacy Med Name: VENLAFAXINE ER 37.5MG CAPSULES] 90 capsule 4     Sig: TAKE 1 CAPSULE(37.5 MG) BY MOUTH DAILY   Last Prescription Date:   2/5/21  Last Fill Qty/Refills:         90, R-4    Last Office Visit:              11/30/21   Future Office visit:           None    Marysol Potter RN .............. 4/29/2022  1:51 PM

## 2022-06-09 DIAGNOSIS — E11.9 TYPE 2 DIABETES MELLITUS WITHOUT COMPLICATION, WITH LONG-TERM CURRENT USE OF INSULIN (H): ICD-10-CM

## 2022-06-09 DIAGNOSIS — Z79.4 TYPE 2 DIABETES MELLITUS WITHOUT COMPLICATION, WITH LONG-TERM CURRENT USE OF INSULIN (H): ICD-10-CM

## 2022-06-09 RX ORDER — LIRAGLUTIDE 6 MG/ML
INJECTION SUBCUTANEOUS
Qty: 18 ML | Refills: 1 | Status: SHIPPED | OUTPATIENT
Start: 2022-06-09 | End: 2022-11-10

## 2022-06-09 NOTE — TELEPHONE ENCOUNTER
[facility] the following:   VICTOZA PEN 18 MG/3ML soln  Sig INJECT 0.6 MG SUBCUTANEOUS DAILY FOR 28 DAYS THEN 1.2 MG DAILY    Last Prescription Date:   12/01/2021 end 03/29/2022  Last Fill Qty/Refills:         18 mL, R-1    Last Office Visit:              11/30/2021 (Melissa)   Future Office visit:           None noted   GLP-1 Agonists Protocol Failed - 6/9/2022  8:06 AM        Failed - HgbA1C in past 3 or 6 months     If HgbA1C is 8 or greater, it needs to be on file within the past 3 months.  If less than 8, must be on file within the past 6 months.     Recent Labs   Lab Test 11/30/21  1113 04/11/18  1045 02/10/17  1402   A1C 8.4*   < >  --    [id number]  --   --  9.1*    < > = values in this interval not displayed.             Failed - Recent (6 mo) or future (30 days) visit within the authorizing provider's specialty     Patient had office visit in the last 6 months or has a visit in the next 30 days with authorizing provider.  See \"Patient Info\" tab in inbasket, or \"Choose Columns\" in Meds & Orders section of the refill encounter.            Passed - Medication is active on med list        Passed - Patient is age 18 or older        Passed - No active pregnancy on record        Passed - Normal serum creatinine on file in past 12 months     Recent Labs   Lab Test 11/30/21  1113   CR 0.64       Ok to refill medication if creatinine is low          Passed - No positive pregnancy test in past 12 months         Unable to complete prescription refill per RN Medication Refill Policy.................... Wendy Gibbs RN ....................  6/9/2022   9:25 AM          "

## 2022-09-14 DIAGNOSIS — Z79.4 TYPE 2 DIABETES MELLITUS WITHOUT COMPLICATION, WITH LONG-TERM CURRENT USE OF INSULIN (H): ICD-10-CM

## 2022-09-14 DIAGNOSIS — E11.9 TYPE 2 DIABETES MELLITUS WITHOUT COMPLICATION, WITH LONG-TERM CURRENT USE OF INSULIN (H): ICD-10-CM

## 2022-09-15 RX ORDER — BLOOD SUGAR DIAGNOSTIC
STRIP MISCELLANEOUS
Qty: 300 STRIP | Refills: 2 | Status: SHIPPED | OUTPATIENT
Start: 2022-09-15 | End: 2023-06-14

## 2022-10-03 ENCOUNTER — MYC MEDICAL ADVICE (OUTPATIENT)
Dept: FAMILY MEDICINE | Facility: OTHER | Age: 52
End: 2022-10-03

## 2022-10-03 DIAGNOSIS — Z12.11 COLON CANCER SCREENING: Primary | ICD-10-CM

## 2022-10-06 DIAGNOSIS — E11.9 TYPE 2 DIABETES MELLITUS WITHOUT COMPLICATION, WITH LONG-TERM CURRENT USE OF INSULIN (H): ICD-10-CM

## 2022-10-06 DIAGNOSIS — Z79.4 TYPE 2 DIABETES MELLITUS WITHOUT COMPLICATION, WITH LONG-TERM CURRENT USE OF INSULIN (H): ICD-10-CM

## 2022-10-10 NOTE — TELEPHONE ENCOUNTER
"[facility] sent Rx request for the following:      Requested Prescriptions   Pending Prescriptions Disp Refills     LANTUS SOLOSTAR 100 UNIT/ML soln [Pharmacy Med Name: LANTUS SOLOSTAR PEN INJ 3ML] 60 mL 4     Sig: ADMINISTER 60 UNITS UNDER THE SKIN AT BEDTIME       Long Acting Insulin Protocol Failed - 10/6/2022 12:52 PM        Failed - HgbA1C in past 3 or 6 months     If HgbA1C is 8 or greater, it needs to be on file within the past 3 months.  If less than 8, must be on file within the past 6 months.     Recent Labs   Lab Test 11/30/21  1113 04/11/18  1045 02/10/17  1402   A1C 8.4*   < >  --    [id number]  --   --  9.1*    < > = values in this interval not displayed.             Failed - Recent (6 mo) or future (30 days) visit within the authorizing provider's specialty     Patient had office visit in the last 6 months or has a visit in the next 30 days with authorizing provider or within the authorizing provider's specialty.  See \"Patient Info\" tab in inbasket, or \"Choose Columns\" in Meds & Orders section of the refill encounter.            Passed - Serum creatinine on file in past 12 months     Recent Labs   Lab Test 11/30/21  1113   CR 0.64       Ok to refill medication if creatinine is low          Passed - Medication is active on med list        Passed - Patient is age 18 or older             Last Prescription Date:   08/26/2021  Last Fill Qty/Refills:         60ml, R-4    Last Office Visit:              11/30/2021  Future Office visit:             Next 5 appointments (look out 90 days)    Nov 10, 2022 10:00 AM  Dane Walker with Irma Torres DO  [facility] ([facility] ) [address]  [place] [id number] [phone]        Raza Crawford RN  ....................  10/10/2022   11:15 AM          "

## 2022-10-11 DIAGNOSIS — Z79.4 TYPE 2 DIABETES MELLITUS WITHOUT COMPLICATION, WITH LONG-TERM CURRENT USE OF INSULIN (H): ICD-10-CM

## 2022-10-11 DIAGNOSIS — E11.9 TYPE 2 DIABETES MELLITUS WITHOUT COMPLICATION, WITH LONG-TERM CURRENT USE OF INSULIN (H): ICD-10-CM

## 2022-10-11 RX ORDER — INSULIN GLARGINE 100 [IU]/ML
INJECTION, SOLUTION SUBCUTANEOUS
Qty: 60 ML | Refills: 4 | Status: SHIPPED | OUTPATIENT
Start: 2022-10-11 | End: 2024-01-25

## 2022-10-14 RX ORDER — LISINOPRIL 2.5 MG/1
TABLET ORAL
Qty: 180 TABLET | Refills: 4 | Status: SHIPPED | OUTPATIENT
Start: 2022-10-14 | End: 2022-10-27

## 2022-10-14 NOTE — TELEPHONE ENCOUNTER
Refill request for lisinopril (ZESTRIL) 2.5 MG tablet from [facility].  Last office visit 11/30/2021, last refill 9/13/2021.  Medication passes RN Refill protocol, routing to PCP to address refill per policy.  Unable to complete prescription refill per RN Medication Refill Policy. Paige Montana RN 10/14/2022 9:15 AM

## 2022-10-25 DIAGNOSIS — Z79.4 TYPE 2 DIABETES MELLITUS WITHOUT COMPLICATION, WITH LONG-TERM CURRENT USE OF INSULIN (H): ICD-10-CM

## 2022-10-25 DIAGNOSIS — E11.9 TYPE 2 DIABETES MELLITUS WITHOUT COMPLICATION, WITH LONG-TERM CURRENT USE OF INSULIN (H): ICD-10-CM

## 2022-10-26 RX ORDER — LISINOPRIL 2.5 MG/1
TABLET ORAL
Qty: 180 TABLET | Refills: 4 | OUTPATIENT
Start: 2022-10-26

## 2022-10-26 NOTE — TELEPHONE ENCOUNTER
[facility] sent Rx request for the following:    LISINOPRIL 2.5MG TABLETS  Last Prescription Date:   10/14/22  Last Fill Qty/Refills:         180, R-4    Last Office Visit:              11/30/21   Future Office visit:           11/10/22  Redundant refill request refused: Too soon:    Brandy San RN on 10/26/2022 at 2:09 PM

## 2022-10-27 RX ORDER — LISINOPRIL 2.5 MG/1
5 TABLET ORAL DAILY
Qty: 180 TABLET | Refills: 4 | Status: SHIPPED | OUTPATIENT
Start: 2022-10-27 | End: 2023-12-06

## 2022-10-27 NOTE — TELEPHONE ENCOUNTER
lisinopril (ZESTRIL) 2.5 MG tablet 180 tablet 4 10/14/2022  No   Sig: TAKE 2 TABLETS BY MOUTH EVERY DAY   Sent to pharmacy as: Lisinopril 2.5 MG Oral Tablet (ZESTRIL)   Class: E-Prescribe   Order: [id number]   E-Prescribing Status: Transmission to pharmacy failed (10/27/2022 12:28 AM CDT)     [id number] - [place], [place] - [address]     Please resend prescription. Marysol Potter RN .............. 10/27/2022  8:07 AM

## 2022-11-09 NOTE — PROGRESS NOTES
Assessment & Plan     1. Type 2 diabetes mellitus without complication, with long-term current use of insulin (H)  Chronic, uncontrolled.  Continue BID checks for elevated A1c.  Start Ozempic 0.25mg weekly x 4 weeks, then 0.5mg weekly until our next follow up.  Monitoring labs as ordered.  Follow up in 3-4 months.  - Lipid Panel; Future  - Comprehensive Metabolic Panel; Future  - CBC and Differential; Future  - Albumin Random Urine Quantitative with Creat Ratio  - Hemoglobin A1c; Future  - semaglutide (OZEMPIC) 2 MG/1.5ML SOPN pen; Inject 0.25 mg Subcutaneous every 7 days for 28 days, THEN 0.5 mg every 7 days for 28 days.  Dispense: 3 mL; Refill: 0  - semaglutide (OZEMPIC, 0.25 OR 0.5 MG/DOSE,) 2 MG/1.5ML SOPN pen; Inject 1 mg Subcutaneous every 7 days for 28 days  Dispense: 9 mL; Refill: 1  - Hemoglobin A1c  - CBC and Differential  - Comprehensive Metabolic Panel  - Lipid Panel    2. Perimenopausal  Chronic, uncertain of status.  Still with menses.  Discussed reasons to check hormone levels as not likely to be helpful with continued menses, but does have previous levels to compare to.  These were added to today's blood work.  - FSH; Future  - Estradiol; Future  - Estradiol  - FSH    3. Hypercholesteremia  Chronic, stable.  Continue on statin as currently ordered.  Monitoring level will be obtained today with blood work.  - Lipid Panel; Future  - Comprehensive Metabolic Panel; Future  - CBC and Differential; Future  - CBC and Differential  - Comprehensive Metabolic Panel  - Lipid Panel    4. Anxiety state  Chronic, stable.  No new concerns.  Obtain TSH.  - TSH; Future  - T4, Free; Future  - Comprehensive Metabolic Panel; Future  - CBC and Differential; Future  - CBC and Differential  - Comprehensive Metabolic Panel  - T4, Free  - TSH    5. Family history of rheumatoid arthritis  Per request and family history - add CRP, ESR to today's lab work.  - Comprehensive Metabolic Panel; Future  - CBC and Differential;  "Future  - CRP inflammation; Future  - Sedimentation Rate (ESR); Future  - Sedimentation Rate (ESR)  - CRP inflammation  - CBC and Differential  - Comprehensive Metabolic Panel    MDM:  Ordering of each unique test  Prescription drug management        BMI:   Estimated body mass index is 32.39 kg/m  as calculated from the following:    Height as of this encounter: 1.619 m (5' 3.75\").    Weight as of this encounter: 84.9 kg (187 lb 3.2 oz).   Weight management plan: Discussed healthy diet and exercise guidelines    Follow up in 3-4 months.    Irma Torres, [facility]   Selena is a 52 year old, presenting for the following health issues:  Diabetes (Follow up diabetes mellitus and labs.)      History of Present Illness       Diabetes:   She presents for follow up of diabetes.  She is checking home blood glucose two times daily. She checks blood glucose before meals and at bedtime.  Blood glucose is sometimes over 200 and never under 70. She is aware of hypoglycemia symptoms including shakiness, dizziness, weakness and lethargy. She has no concerns regarding her diabetes at this time.  She is not experiencing numbness or burning in feet, excessive thirst, blurry vision, weight changes or redness, sores or blisters on feet. The patient has not had a diabetic eye exam in the last 12 months.         She eats 0-1 servings of fruits and vegetables daily.She consumes 1 sweetened beverage(s) daily.She exercises with enough effort to increase her heart rate 9 or less minutes per day.  She exercises with enough effort to increase her heart rate 3 or less days per week.   She is taking medications regularly.    Today's PHQ-9         PHQ-9 Total Score: 0    PHQ-9 Q9 Thoughts of better off dead/self-harm past 2 weeks :   Not at all    How difficult have these problems made it for you to do your work, take care of things at home, or get along with other people: Not difficult at all  Today's " MARCELA-7 Score: 1       Diabetes Follow-up  How often are you checking your blood sugar? Two times daily  Blood sugar testing frequency justification:  Uncontrolled diabetes and Adjustment of medication(s)  What time of day are you checking your blood sugars (select all that apply)?  Before meals and At bedtime  Have you had any blood sugars above 200?  Yes  Have you had any blood sugars below 70?  No    What symptoms do you notice when your blood sugar is low?  Shaky, Dizzy and Weak    What concerns do you have today about your diabetes?  Blood sugar is often over 200     Do you have any of these symptoms? (Select all that apply)  No numbness or tingling in feet.  No redness, sores or blisters on feet.  No complaints of excessive thirst.  No reports of blurry vision.  No significant changes to weight.    Have you had a diabetic eye exam in the last 12 months? Yes    Hyperlipidemia Follow-Up    Are you regularly taking any medication or supplement to lower your cholesterol?   Yes- simvastatin    Are you having muscle aches or other side effects that you think could be caused by your cholesterol lowering medication?  No    Hypertension Follow-up    Do you check your blood pressure regularly outside of the clinic? No    Are you following a low salt diet? Yes; trying    Are your blood pressures ever more than 140 on the top number (systolic) OR more   than 90 on the bottom number (diastolic), for example 140/90? uncertain    BP Readings from Last 2 Encounters:   11/10/22 138/88   11/30/21 126/78     Hemoglobin A1C (%)   Date Value   11/30/2021 8.4 (H)   02/05/2021 8.2 (H)   07/31/2020 7.2 (H)     LDL Cholesterol Calculated (mg/dL)   Date Value   11/30/2021 92   07/31/2020 57   06/20/2019 40       Has family history of RA; would like to continue to monitor inflammatory markers.  Hard to tell RA vs normal progression of OA.  No new symptoms besides knee OA.        Objective    /88 (BP Location: Right arm, Patient  "Position: Sitting, Cuff Size: Adult Large)   Pulse 78   Temp 97.3  F (36.3  C) (Tympanic)   Resp 18   Ht 1.619 m (5' 3.75\")   Wt 84.9 kg (187 lb 3.2 oz)   LMP 10/05/2022 (Exact Date)   SpO2 98%   Breastfeeding No   BMI 32.39 kg/m    Body mass index is 32.39 kg/m .  Physical Exam   GENERAL: healthy, alert and no distress  NECK: no adenopathy, no asymmetry, masses, or scars and thyroid normal to palpation  RESP: lungs clear to auscultation - no rales, rhonchi or wheezes  CV: regular rate and rhythm, normal S1 S2, no S3 or S4, no murmur, click or rub, no peripheral edema and peripheral pulses strong  ABDOMEN: soft, nontender, no hepatosplenomegaly, no masses and bowel sounds normal  MS: no gross musculoskeletal defects noted, no edema    No results found for any visits on 11/10/22.    "

## 2022-11-10 ENCOUNTER — OFFICE VISIT (OUTPATIENT)
Dept: FAMILY MEDICINE | Facility: OTHER | Age: 52
End: 2022-11-10
Attending: FAMILY MEDICINE
Payer: COMMERCIAL

## 2022-11-10 VITALS
WEIGHT: 187.2 LBS | HEART RATE: 78 BPM | DIASTOLIC BLOOD PRESSURE: 88 MMHG | HEIGHT: 64 IN | BODY MASS INDEX: 31.96 KG/M2 | RESPIRATION RATE: 18 BRPM | TEMPERATURE: 97.3 F | SYSTOLIC BLOOD PRESSURE: 138 MMHG | OXYGEN SATURATION: 98 %

## 2022-11-10 DIAGNOSIS — Z79.4 TYPE 2 DIABETES MELLITUS WITHOUT COMPLICATION, WITH LONG-TERM CURRENT USE OF INSULIN (H): Primary | ICD-10-CM

## 2022-11-10 DIAGNOSIS — Z82.61 FAMILY HISTORY OF RHEUMATOID ARTHRITIS: ICD-10-CM

## 2022-11-10 DIAGNOSIS — F41.1 ANXIETY STATE: ICD-10-CM

## 2022-11-10 DIAGNOSIS — N95.1 PERIMENOPAUSAL: ICD-10-CM

## 2022-11-10 DIAGNOSIS — E78.00 HYPERCHOLESTEREMIA: ICD-10-CM

## 2022-11-10 DIAGNOSIS — E11.9 TYPE 2 DIABETES MELLITUS WITHOUT COMPLICATION, WITH LONG-TERM CURRENT USE OF INSULIN (H): Primary | ICD-10-CM

## 2022-11-10 LAB
ALBUMIN SERPL BCG-MCNC: 4.4 G/DL (ref 3.5–5.2)
ALP SERPL-CCNC: 63 U/L (ref 35–104)
ALT SERPL W P-5'-P-CCNC: 27 U/L (ref 10–35)
ANION GAP SERPL CALCULATED.3IONS-SCNC: 11 MMOL/L (ref 7–15)
AST SERPL W P-5'-P-CCNC: 24 U/L (ref 10–35)
BASOPHILS # BLD AUTO: 0.1 10E3/UL (ref 0–0.2)
BASOPHILS NFR BLD AUTO: 1 %
BILIRUB SERPL-MCNC: 0.2 MG/DL
BUN SERPL-MCNC: 18 MG/DL (ref 6–20)
CALCIUM SERPL-MCNC: 9.8 MG/DL (ref 8.6–10)
CHLORIDE SERPL-SCNC: 102 MMOL/L (ref 98–107)
CHOLEST SERPL-MCNC: 169 MG/DL
CREAT SERPL-MCNC: 0.61 MG/DL (ref 0.51–0.95)
CREAT UR-MCNC: 95 MG/DL
CRP SERPL-MCNC: <3 MG/L
DEPRECATED HCO3 PLAS-SCNC: 24 MMOL/L (ref 22–29)
EOSINOPHIL # BLD AUTO: 0.5 10E3/UL (ref 0–0.7)
EOSINOPHIL NFR BLD AUTO: 6 %
ERYTHROCYTE [DISTWIDTH] IN BLOOD BY AUTOMATED COUNT: 11.9 % (ref 10–15)
ERYTHROCYTE [SEDIMENTATION RATE] IN BLOOD BY WESTERGREN METHOD: 7 MM/HR (ref 0–30)
ESTRADIOL SERPL-MCNC: 53 PG/ML
FSH SERPL IRP2-ACNC: 26.4 MIU/ML
GFR SERPL CREATININE-BSD FRML MDRD: >90 ML/MIN/1.73M2
GLUCOSE SERPL-MCNC: 223 MG/DL (ref 70–99)
HBA1C MFR BLD: 9.9 % (ref 4–6.2)
HCT VFR BLD AUTO: 42.1 % (ref 35–47)
HDLC SERPL-MCNC: 64 MG/DL
HGB BLD-MCNC: 13.9 G/DL (ref 11.7–15.7)
IMM GRANULOCYTES # BLD: 0 10E3/UL
IMM GRANULOCYTES NFR BLD: 1 %
LDLC SERPL CALC-MCNC: 73 MG/DL
LYMPHOCYTES # BLD AUTO: 1.9 10E3/UL (ref 0.8–5.3)
LYMPHOCYTES NFR BLD AUTO: 24 %
MCH RBC QN AUTO: 30.5 PG (ref 26.5–33)
MCHC RBC AUTO-ENTMCNC: 33 G/DL (ref 31.5–36.5)
MCV RBC AUTO: 92 FL (ref 78–100)
MICROALBUMIN UR-MCNC: <12 MG/L
MICROALBUMIN/CREAT UR: NORMAL MG/G{CREAT}
MONOCYTES # BLD AUTO: 0.5 10E3/UL (ref 0–1.3)
MONOCYTES NFR BLD AUTO: 6 %
NEUTROPHILS # BLD AUTO: 5 10E3/UL (ref 1.6–8.3)
NEUTROPHILS NFR BLD AUTO: 62 %
NONHDLC SERPL-MCNC: 105 MG/DL
NRBC # BLD AUTO: 0 10E3/UL
NRBC BLD AUTO-RTO: 0 /100
PLATELET # BLD AUTO: 269 10E3/UL (ref 150–450)
POTASSIUM SERPL-SCNC: 4.4 MMOL/L (ref 3.4–5.3)
PROT SERPL-MCNC: 7.3 G/DL (ref 6.4–8.3)
RBC # BLD AUTO: 4.56 10E6/UL (ref 3.8–5.2)
SODIUM SERPL-SCNC: 137 MMOL/L (ref 136–145)
T4 FREE SERPL-MCNC: 0.91 NG/DL (ref 0.9–1.7)
TRIGL SERPL-MCNC: 158 MG/DL
TSH SERPL DL<=0.005 MIU/L-ACNC: 1.93 UIU/ML (ref 0.3–4.2)
WBC # BLD AUTO: 8 10E3/UL (ref 4–11)

## 2022-11-10 PROCEDURE — 82670 ASSAY OF TOTAL ESTRADIOL: CPT | Mod: ZL | Performed by: FAMILY MEDICINE

## 2022-11-10 PROCEDURE — 84439 ASSAY OF FREE THYROXINE: CPT | Mod: ZL | Performed by: FAMILY MEDICINE

## 2022-11-10 PROCEDURE — 83001 ASSAY OF GONADOTROPIN (FSH): CPT | Mod: ZL | Performed by: FAMILY MEDICINE

## 2022-11-10 PROCEDURE — 99214 OFFICE O/P EST MOD 30 MIN: CPT | Performed by: FAMILY MEDICINE

## 2022-11-10 PROCEDURE — 80061 LIPID PANEL: CPT | Mod: ZL | Performed by: FAMILY MEDICINE

## 2022-11-10 PROCEDURE — 82043 UR ALBUMIN QUANTITATIVE: CPT | Mod: ZL | Performed by: FAMILY MEDICINE

## 2022-11-10 PROCEDURE — 82040 ASSAY OF SERUM ALBUMIN: CPT | Mod: ZL | Performed by: FAMILY MEDICINE

## 2022-11-10 PROCEDURE — 85652 RBC SED RATE AUTOMATED: CPT | Mod: ZL | Performed by: FAMILY MEDICINE

## 2022-11-10 PROCEDURE — 86140 C-REACTIVE PROTEIN: CPT | Mod: ZL | Performed by: FAMILY MEDICINE

## 2022-11-10 PROCEDURE — 83036 HEMOGLOBIN GLYCOSYLATED A1C: CPT | Mod: ZL | Performed by: FAMILY MEDICINE

## 2022-11-10 PROCEDURE — 80053 COMPREHEN METABOLIC PANEL: CPT | Mod: ZL | Performed by: FAMILY MEDICINE

## 2022-11-10 PROCEDURE — 36415 COLL VENOUS BLD VENIPUNCTURE: CPT | Mod: ZL | Performed by: FAMILY MEDICINE

## 2022-11-10 PROCEDURE — 85025 COMPLETE CBC W/AUTO DIFF WBC: CPT | Mod: ZL | Performed by: FAMILY MEDICINE

## 2022-11-10 PROCEDURE — 84443 ASSAY THYROID STIM HORMONE: CPT | Mod: ZL | Performed by: FAMILY MEDICINE

## 2022-11-10 RX ORDER — SEMAGLUTIDE 1.34 MG/ML
1 INJECTION, SOLUTION SUBCUTANEOUS
Qty: 9 ML | Refills: 1 | Status: SHIPPED | OUTPATIENT
Start: 2022-11-10 | End: 2022-12-08

## 2022-11-10 ASSESSMENT — ANXIETY QUESTIONNAIRES
2. NOT BEING ABLE TO STOP OR CONTROL WORRYING: NOT AT ALL
8. IF YOU CHECKED OFF ANY PROBLEMS, HOW DIFFICULT HAVE THESE MADE IT FOR YOU TO DO YOUR WORK, TAKE CARE OF THINGS AT HOME, OR GET ALONG WITH OTHER PEOPLE?: SOMEWHAT DIFFICULT
6. BECOMING EASILY ANNOYED OR IRRITABLE: SEVERAL DAYS
IF YOU CHECKED OFF ANY PROBLEMS ON THIS QUESTIONNAIRE, HOW DIFFICULT HAVE THESE PROBLEMS MADE IT FOR YOU TO DO YOUR WORK, TAKE CARE OF THINGS AT HOME, OR GET ALONG WITH OTHER PEOPLE: SOMEWHAT DIFFICULT
7. FEELING AFRAID AS IF SOMETHING AWFUL MIGHT HAPPEN: NOT AT ALL
3. WORRYING TOO MUCH ABOUT DIFFERENT THINGS: NOT AT ALL
3. WORRYING TOO MUCH ABOUT DIFFERENT THINGS: NOT AT ALL
2. NOT BEING ABLE TO STOP OR CONTROL WORRYING: NOT AT ALL
5. BEING SO RESTLESS THAT IT IS HARD TO SIT STILL: NOT AT ALL
7. FEELING AFRAID AS IF SOMETHING AWFUL MIGHT HAPPEN: NOT AT ALL
GAD7 TOTAL SCORE: 0
5. BEING SO RESTLESS THAT IT IS HARD TO SIT STILL: NOT AT ALL
GAD7 TOTAL SCORE: 1
GAD7 TOTAL SCORE: 1
7. FEELING AFRAID AS IF SOMETHING AWFUL MIGHT HAPPEN: NOT AT ALL
4. TROUBLE RELAXING: NOT AT ALL
6. BECOMING EASILY ANNOYED OR IRRITABLE: NOT AT ALL
GAD7 TOTAL SCORE: 1
1. FEELING NERVOUS, ANXIOUS, OR ON EDGE: NOT AT ALL
1. FEELING NERVOUS, ANXIOUS, OR ON EDGE: NOT AT ALL

## 2022-11-10 ASSESSMENT — PATIENT HEALTH QUESTIONNAIRE - PHQ9
SUM OF ALL RESPONSES TO PHQ QUESTIONS 1-9: 0
SUM OF ALL RESPONSES TO PHQ QUESTIONS 1-9: 0
10. IF YOU CHECKED OFF ANY PROBLEMS, HOW DIFFICULT HAVE THESE PROBLEMS MADE IT FOR YOU TO DO YOUR WORK, TAKE CARE OF THINGS AT HOME, OR GET ALONG WITH OTHER PEOPLE: NOT DIFFICULT AT ALL
5. POOR APPETITE OR OVEREATING: NOT AT ALL

## 2022-11-10 ASSESSMENT — PAIN SCALES - GENERAL: PAINLEVEL: NO PAIN (0)

## 2022-11-10 NOTE — NURSING NOTE
"Chief Complaint   Patient presents with     Diabetes     Follow up diabetes mellitus and labs.       Initial /88 (BP Location: Right arm, Patient Position: Sitting, Cuff Size: Adult Large)   Pulse 78   Temp 97.3  F (36.3  C) (Tympanic)   Resp 18   Ht 1.619 m (5' 3.75\")   Wt 84.9 kg (187 lb 3.2 oz)   LMP 10/05/2022 (Exact Date)   SpO2 98%   Breastfeeding No   BMI 32.39 kg/m   Estimated body mass index is 32.39 kg/m  as calculated from the following:    Height as of this encounter: 1.619 m (5' 3.75\").    Weight as of this encounter: 84.9 kg (187 lb 3.2 oz).  Medication Reconciliation: complete    Rosa Elena Sales LPN  "

## 2022-11-18 DIAGNOSIS — Z79.4 TYPE 2 DIABETES MELLITUS WITHOUT COMPLICATION, WITH LONG-TERM CURRENT USE OF INSULIN (H): ICD-10-CM

## 2022-11-18 DIAGNOSIS — E78.00 HYPERCHOLESTEREMIA: ICD-10-CM

## 2022-11-18 DIAGNOSIS — E11.9 TYPE 2 DIABETES MELLITUS WITHOUT COMPLICATION, WITH LONG-TERM CURRENT USE OF INSULIN (H): ICD-10-CM

## 2022-11-22 RX ORDER — SIMVASTATIN 20 MG
TABLET ORAL
Qty: 90 TABLET | Refills: 3 | Status: SHIPPED | OUTPATIENT
Start: 2022-11-22 | End: 2023-09-12

## 2022-11-22 NOTE — TELEPHONE ENCOUNTER
"     Disp Refills Start End MAZIN   simvastatin (ZOCOR) 20 MG tablet 90 tablet 4 8/31/2021  No   Sig: TAKE 1 TABLET(20 MG) BY MOUTH AT BEDTIME   Sent to pharmacy as: Simvastatin 20 MG Oral Tablet (ZOCOR)   Class: E-Prescribe   Order: [id number]     Last Office Visit: 11/10/2022  Future Office visit:       Requested Prescriptions   Pending Prescriptions Disp Refills     simvastatin (ZOCOR) 20 MG tablet [Pharmacy Med Name: SIMVASTATIN 20MG TABLETS] 90 tablet 4     Sig: TAKE 1 TABLET(20 MG) BY MOUTH AT BEDTIME       Statins Protocol Passed - 11/18/2022  2:20 PM        Passed - LDL on file in past 12 months     Recent Labs   Lab Test 11/10/22  1044   LDL 73             Passed - No abnormal creatine kinase in past 12 months     No lab results found.             Passed - Recent (12 mo) or future (30 days) visit within the authorizing provider's specialty     Patient has had an office visit with the authorizing provider or a provider within the authorizing providers department within the previous 12 mos or has a future within next 30 days. See \"Patient Info\" tab in inbasket, or \"Choose Columns\" in Meds & Orders section of the refill encounter.              Passed - Medication is active on med list        Passed - Patient is age 18 or older        Passed - No active pregnancy on record        Passed - No positive pregnancy test in past 12 months             Routing refill request to provider for review/approval.      Unable to complete prescription refill per RNMedication Refill Policy.................... Maryan Lyn RN ....................  11/22/2022   9:15 AM          "

## 2022-12-29 ENCOUNTER — TRANSFERRED RECORDS (OUTPATIENT)
Dept: HEALTH INFORMATION MANAGEMENT | Facility: OTHER | Age: 52
End: 2022-12-29

## 2022-12-29 LAB — RETINOPATHY: NEGATIVE

## 2023-01-23 ENCOUNTER — HEALTH MAINTENANCE LETTER (OUTPATIENT)
Age: 53
End: 2023-01-23

## 2023-04-10 NOTE — PROGRESS NOTES
Assessment & Plan   1. Uncontrolled diabetes mellitus with hyperglycemia, without long-term current use of insulin (H)  Chronic, improved since last visit and starting Ozempic by reported blood sugar checks and A1c improvement.  Continue on 1mg/week dose.  Refilled x 1 year.  On statin/ACEI/asa.  Follow up in 6 months for repeat labs; sooner prn.  - Hemoglobin A1c; Future  - Basic Metabolic Panel; Future  - Hemoglobin A1c  - Basic Metabolic Panel    2. Ganglion cyst of wrist, left  Chronic, no acute concerns.  Monitor.  Aware of need for imaging/surgical consult if desiring removal - will contact me if becomes more problematic or would want to move forward for imaging/referral.    MDM:  Ordering of each unique test  Prescription drug management     Irma Torres, [facility]      Subjective   Selena is a 52 year old, presenting for the following health issues:  Diabetes and Mass (Left wrist )    History of Present Illness       Diabetes:   She presents for follow up of diabetes.  She is checking home blood glucose two times daily. She checks blood glucose before meals and at bedtime.  Blood glucose is sometimes over 200 and sometimes under 70. She is aware of hypoglycemia symptoms including shakiness, dizziness, weakness and blurred vision. She has no concerns regarding her diabetes at this time.  She is not experiencing numbness or burning in feet, excessive thirst, blurry vision, weight changes or redness, sores or blisters on feet.         Reason for visit:  Follow-up for an A1C check    She eats 0-1 servings of fruits and vegetables daily.She consumes 0 sweetened beverage(s) daily.She exercises with enough effort to increase her heart rate 9 or less minutes per day.  She exercises with enough effort to increase her heart rate 3 or less days per week.   She is taking medications regularly.     Diabetes: improving with Ozempic.  Last A1c was 11/2022: 9.9%  On Metformin 1000mg BID,  Lantus 60u subcutaneous at bedtime.  Has tried Victoza previously, and started Ozempic at our last visit.  Had some nausea with the first few doses; but has done well with the dose at this time.  At 1mg daily.    Mostly in the 130-150s.  Only low was 93 - not much for symptoms at that level.    Also has bump on L wrist; noticed over the last 2-3 months.  Pain: N  Not worsening  Notices it more just looking at it.  Has tried: NA        Objective    /80   Pulse 77   Temp 98.3  F (36.8  C) (Tympanic)   Resp 16   Wt 78.2 kg (172 lb 6.4 oz)   LMP 03/30/2023 (Exact Date)   SpO2 100%   Breastfeeding No   BMI 29.82 kg/m    Body mass index is 29.82 kg/m .  Physical Exam   GENERAL: healthy, alert and no distress  NECK: no adenopathy, no asymmetry, masses, or scars and thyroid normal to palpation  RESP: lungs clear to auscultation - no rales, rhonchi or wheezes  CV: regular rate and rhythm, normal S1 S2, no S3 or S4, no murmur, click or rub, no peripheral edema and peripheral pulses strong  MS: normal muscle tone, normal range of motion and 1cm subcutaneous nodule on L wrist - radial/ventral aspect - mobile, non tender.  SKIN: no suspicious lesions or rashes    No results found for any visits on 04/12/23.

## 2023-04-12 ENCOUNTER — OFFICE VISIT (OUTPATIENT)
Dept: FAMILY MEDICINE | Facility: OTHER | Age: 53
End: 2023-04-12
Attending: FAMILY MEDICINE
Payer: COMMERCIAL

## 2023-04-12 VITALS
DIASTOLIC BLOOD PRESSURE: 80 MMHG | RESPIRATION RATE: 16 BRPM | SYSTOLIC BLOOD PRESSURE: 132 MMHG | TEMPERATURE: 98.3 F | WEIGHT: 172.4 LBS | BODY MASS INDEX: 29.82 KG/M2 | HEART RATE: 77 BPM | OXYGEN SATURATION: 100 %

## 2023-04-12 DIAGNOSIS — E11.9 TYPE 2 DIABETES MELLITUS WITHOUT COMPLICATION, WITH LONG-TERM CURRENT USE OF INSULIN (H): ICD-10-CM

## 2023-04-12 DIAGNOSIS — Z79.4 TYPE 2 DIABETES MELLITUS WITHOUT COMPLICATION, WITH LONG-TERM CURRENT USE OF INSULIN (H): ICD-10-CM

## 2023-04-12 DIAGNOSIS — E11.65 UNCONTROLLED DIABETES MELLITUS WITH HYPERGLYCEMIA, WITHOUT LONG-TERM CURRENT USE OF INSULIN (H): Primary | ICD-10-CM

## 2023-04-12 DIAGNOSIS — M67.432 GANGLION CYST OF WRIST, LEFT: ICD-10-CM

## 2023-04-12 LAB
ANION GAP SERPL CALCULATED.3IONS-SCNC: 6 MMOL/L (ref 7–15)
BUN SERPL-MCNC: 15 MG/DL (ref 6–20)
CALCIUM SERPL-MCNC: 9.4 MG/DL (ref 8.6–10)
CHLORIDE SERPL-SCNC: 103 MMOL/L (ref 98–107)
CREAT SERPL-MCNC: 0.63 MG/DL (ref 0.51–0.95)
DEPRECATED HCO3 PLAS-SCNC: 30 MMOL/L (ref 22–29)
GFR SERPL CREATININE-BSD FRML MDRD: >90 ML/MIN/1.73M2
GLUCOSE SERPL-MCNC: 130 MG/DL (ref 70–99)
HBA1C MFR BLD: 7.3 % (ref 4–6.2)
HOLD SPECIMEN: NORMAL
POTASSIUM SERPL-SCNC: 4.4 MMOL/L (ref 3.4–5.3)
SODIUM SERPL-SCNC: 139 MMOL/L (ref 136–145)

## 2023-04-12 PROCEDURE — 99214 OFFICE O/P EST MOD 30 MIN: CPT | Performed by: FAMILY MEDICINE

## 2023-04-12 PROCEDURE — 83036 HEMOGLOBIN GLYCOSYLATED A1C: CPT | Mod: ZL | Performed by: FAMILY MEDICINE

## 2023-04-12 PROCEDURE — 36415 COLL VENOUS BLD VENIPUNCTURE: CPT | Mod: ZL | Performed by: FAMILY MEDICINE

## 2023-04-12 PROCEDURE — 80048 BASIC METABOLIC PNL TOTAL CA: CPT | Mod: ZL | Performed by: FAMILY MEDICINE

## 2023-04-12 RX ORDER — SEMAGLUTIDE 1.34 MG/ML
1 INJECTION, SOLUTION SUBCUTANEOUS
COMMUNITY
Start: 2023-03-01 | End: 2023-04-12

## 2023-04-12 RX ORDER — SEMAGLUTIDE 1.34 MG/ML
1 INJECTION, SOLUTION SUBCUTANEOUS
Qty: 9 ML | Refills: 4 | Status: SHIPPED | OUTPATIENT
Start: 2023-04-12

## 2023-04-12 ASSESSMENT — PAIN SCALES - GENERAL: PAINLEVEL: NO PAIN (0)

## 2023-04-12 NOTE — PATIENT INSTRUCTIONS
Cologuard Patient Instructions    You received an order for a Cologuard test. You will receive your kit in the mail. Please follow the instructions that are provided in the kit.      Reminder: Ship Cologuard test the same day or the next day to allow enough delivery time. The lab must receive your specimen within 4 days for successful testing. If not delivered in time, you may have to complete the process again.    Home Pick-up:  Once you complete the kit, call [facility] at [phone] and they will schedule a UPS pickup for you.    OR    Drop Off Locations:  Once you have completed the collection and have it ready to be shipped, bring it to one of the following sites listed below. *Note: none of the sites ship out later than mid-morning. Please do not drop off Fridays, as they will not go out until Monday which will make your specimen inacceptable.     - [facility] ([address], [place] [address])      Drop off: Monday-Thursday, 8:00am-4:30pm at the [address] check-in desk      - [facility] ([address], [place] [address])   Drop off: Monday-Thursday, 8:00am-5:45pm     - [facility] ([address], [place] [address])     Drop off: Monday-Thursday, 3:00pm-5:30pm

## 2023-04-12 NOTE — PROGRESS NOTES
Previous A1C is not at goal of <8  Lab Results   Component Value Date    A1C 9.9 11/10/2022    A1C 8.4 11/30/2021    A1C 8.2 02/05/2021    A1C 7.2 07/31/2020    A1C 8.7 06/20/2019    A1C 7.6 09/21/2018    A1C 9.1 04/11/2018     Urine microalbumin:creatine: 11/10/22  Foot exam DUE   Eye exam 12/29/22    Tobacco User No   Patient is on a daily aspirin  Patient is on a Statin.  Blood pressure today of:     BP Readings from Last 1 Encounters:   04/12/23 132/80      is at the goal of <139/89 for diabetics.    Ros Weldon CMA on 4/12/2023 at 10:54 AM      Answers for HPI/ROS submitted by the patient on 4/5/2023  Frequency of checking blood sugars:: two times daily  What time of day are you checking your blood sugars : before meals, at bedtime  Have you had any blood sugars above 200?: Yes  Have you had any blood sugars below 70?: Yes  Hypoglycemia symptoms:: shakiness, dizziness, weakness, blurred vision  Diabetic concerns:: none  Paraesthesia present:: none of these symptoms  What is the reason for your visit today? : Follow-up for an A1C check  How many servings of fruits and vegetables do you eat daily?: 0-1  On average, how many sweetened beverages do you drink each day (Examples: soda, juice, sweet tea, etc.  Do NOT count diet or artificially sweetened beverages)?: 0  How many minutes a day do you exercise enough to make your heart beat faster?: 9 or less  How many days a week do you exercise enough to make your heart beat faster?: 3 or less  How many days per week do you miss taking your medication?: 0

## 2023-04-12 NOTE — NURSING NOTE
"Chief Complaint   Patient presents with     Diabetes     Mass     Left wrist      Patient is here for diabetic check and a lump on left wrist     Initial /80   Pulse 77   Temp 98.3  F (36.8  C) (Tympanic)   Resp 16   Wt 78.2 kg (172 lb 6.4 oz)   LMP 03/30/2023 (Exact Date)   SpO2 100%   Breastfeeding No   BMI 29.82 kg/m   Estimated body mass index is 29.82 kg/m  as calculated from the following:    Height as of 11/10/22: 1.619 m (5' 3.75\").    Weight as of this encounter: 78.2 kg (172 lb 6.4 oz).  Medication Reconciliation: complete    Ros Weldon CMA       FOOD SECURITY SCREENING QUESTIONS:    The next two questions are to help us understand your food security.  If you are feeling you need any assistance in this area, we have resources available to support you today.    Hunger Vital Signs:  Within the past 12 months we worried whether our food would run out before we got money to buy more. Never  Within the past 12 months the food we bought just didn't last and we didn't have money to get more. Never  Ros Weldon CMA,LPN on 4/12/2023 at 10:53 AM      "

## 2023-06-02 DIAGNOSIS — R45.86 LABILE MOOD: ICD-10-CM

## 2023-06-02 DIAGNOSIS — N95.1 PERIMENOPAUSAL: ICD-10-CM

## 2023-06-07 RX ORDER — VENLAFAXINE HYDROCHLORIDE 37.5 MG/1
CAPSULE, EXTENDED RELEASE ORAL
Qty: 90 CAPSULE | Refills: 4 | Status: SHIPPED | OUTPATIENT
Start: 2023-06-07 | End: 2024-06-07

## 2023-06-07 NOTE — TELEPHONE ENCOUNTER
"     Disp Refills Start End MAZIN   venlafaxine (EFFEXOR-XR) 37.5 MG 24 hr capsule 90 capsule 4 4/29/2022  No   Sig: TAKE 1 CAPSULE(37.5 MG) BY MOUTH DAILY   Sent to pharmacy as: Venlafaxine HCl ER 37.5 MG Oral Capsule Extended Release 24 Hour (EFFEXOR-XR)   Class: E-Prescribe     Last Office Visit: 04/12/2023  Future Office visit:         Requested Prescriptions   Pending Prescriptions Disp Refills     venlafaxine (EFFEXOR XR) 37.5 MG 24 hr capsule [Pharmacy Med Name: VENLAFAXINE ER 37.5MG CAPSULES] 90 capsule 4     Sig: TAKE 1 CAPSULE(37.5 MG) BY MOUTH DAILY       Serotonin-Norepinephrine Reuptake Inhibitors  Passed - 6/2/2023  1:35 PM        Passed - Blood pressure under 140/90 in past 12 months     BP Readings from Last 3 Encounters:   04/12/23 132/80   11/10/22 138/88   11/30/21 126/78                 Passed - Recent (12 mo) or future (30 days) visit within the authorizing provider's specialty     Patient has had an office visit with the authorizing provider or a provider within the authorizing providers department within the previous 12 mos or has a future within next 30 days. See \"Patient Info\" tab in inbasket, or \"Choose Columns\" in Meds & Orders section of the refill encounter.              Passed - Medication is active on med list        Passed - Patient is age 18 or older        Passed - No active pregnancy on record        Passed - Normal serum creatinine on file in past 12 months     Recent Labs   Lab Test 04/12/23  1123   CR 0.63       Ok to refill medication if creatinine is low          Passed - No positive pregnancy test in past 12 months             Routing refill request to provider for review/approval.    Unable to complete prescription refill per RNMedication Refill Policy.................... Maryan Lyn RN ....................  6/7/2023   1:00 PM          "

## 2023-06-09 DIAGNOSIS — Z79.4 TYPE 2 DIABETES MELLITUS WITHOUT COMPLICATION, WITH LONG-TERM CURRENT USE OF INSULIN (H): ICD-10-CM

## 2023-06-09 DIAGNOSIS — E11.9 TYPE 2 DIABETES MELLITUS WITHOUT COMPLICATION, WITH LONG-TERM CURRENT USE OF INSULIN (H): ICD-10-CM

## 2023-06-14 RX ORDER — BLOOD SUGAR DIAGNOSTIC
STRIP MISCELLANEOUS
Qty: 300 STRIP | Refills: 1 | Status: SHIPPED | OUTPATIENT
Start: 2023-06-14 | End: 2024-04-25

## 2023-06-14 NOTE — TELEPHONE ENCOUNTER
Routing refill request to provider for review/approval because:    LOV: 4/12/23    Cari Tarango RN on 6/14/2023 at 1:51 PM

## 2023-08-14 ENCOUNTER — PATIENT OUTREACH (OUTPATIENT)
Dept: FAMILY MEDICINE | Facility: OTHER | Age: 53
End: 2023-08-14
Payer: COMMERCIAL

## 2023-08-14 NOTE — TELEPHONE ENCOUNTER
Patient Quality Outreach    Patient is due for the following:   Colon Cancer Screening    Next Steps:   No follow up needed at this time.    Type of outreach:    Phone, left message for patient/parent to call back.  Left message stating we are reaching out regarding a test that was sent to the pt s house in October 2022 and has not been returned or completed. Left main clinic number and instructed them to ask for Naz NICOLE in [address]. Ext. [phone]     Pt's diliprd expires 10/04/2023    Questions for provider review:    None           Naz Cardenas

## 2023-09-12 DIAGNOSIS — Z79.4 TYPE 2 DIABETES MELLITUS WITHOUT COMPLICATION, WITH LONG-TERM CURRENT USE OF INSULIN (H): ICD-10-CM

## 2023-09-12 DIAGNOSIS — E11.9 TYPE 2 DIABETES MELLITUS WITHOUT COMPLICATION, WITH LONG-TERM CURRENT USE OF INSULIN (H): ICD-10-CM

## 2023-09-12 DIAGNOSIS — E78.00 HYPERCHOLESTEREMIA: ICD-10-CM

## 2023-09-12 RX ORDER — SIMVASTATIN 20 MG
TABLET ORAL
Qty: 90 TABLET | Refills: 3 | Status: SHIPPED | OUTPATIENT
Start: 2023-09-12

## 2023-12-05 DIAGNOSIS — Z79.4 TYPE 2 DIABETES MELLITUS WITHOUT COMPLICATION, WITH LONG-TERM CURRENT USE OF INSULIN (H): ICD-10-CM

## 2023-12-05 DIAGNOSIS — E11.9 TYPE 2 DIABETES MELLITUS WITHOUT COMPLICATION, WITH LONG-TERM CURRENT USE OF INSULIN (H): ICD-10-CM

## 2023-12-06 RX ORDER — LISINOPRIL 2.5 MG/1
TABLET ORAL
Qty: 180 TABLET | Refills: 4 | Status: SHIPPED | OUTPATIENT
Start: 2023-12-06

## 2024-01-24 ENCOUNTER — TRANSFERRED RECORDS (OUTPATIENT)
Dept: HEALTH INFORMATION MANAGEMENT | Facility: OTHER | Age: 54
End: 2024-01-24
Payer: COMMERCIAL

## 2024-01-24 LAB — RETINOPATHY: NEGATIVE

## 2024-01-25 ENCOUNTER — OFFICE VISIT (OUTPATIENT)
Dept: FAMILY MEDICINE | Facility: OTHER | Age: 54
End: 2024-01-25
Attending: FAMILY MEDICINE
Payer: COMMERCIAL

## 2024-01-25 VITALS
HEIGHT: 64 IN | BODY MASS INDEX: 27 KG/M2 | WEIGHT: 158.13 LBS | HEART RATE: 91 BPM | DIASTOLIC BLOOD PRESSURE: 68 MMHG | OXYGEN SATURATION: 100 % | SYSTOLIC BLOOD PRESSURE: 106 MMHG | TEMPERATURE: 97.2 F | RESPIRATION RATE: 20 BRPM

## 2024-01-25 DIAGNOSIS — E78.00 HYPERCHOLESTEREMIA: ICD-10-CM

## 2024-01-25 DIAGNOSIS — E11.9 TYPE 2 DIABETES MELLITUS WITHOUT COMPLICATION, WITH LONG-TERM CURRENT USE OF INSULIN (H): Primary | ICD-10-CM

## 2024-01-25 DIAGNOSIS — Z23 NEED FOR SHINGLES VACCINE: ICD-10-CM

## 2024-01-25 DIAGNOSIS — Z79.4 TYPE 2 DIABETES MELLITUS WITHOUT COMPLICATION, WITH LONG-TERM CURRENT USE OF INSULIN (H): Primary | ICD-10-CM

## 2024-01-25 DIAGNOSIS — Z12.31 VISIT FOR SCREENING MAMMOGRAM: ICD-10-CM

## 2024-01-25 DIAGNOSIS — Z23 NEEDS FLU SHOT: ICD-10-CM

## 2024-01-25 DIAGNOSIS — Z12.11 COLON CANCER SCREENING: ICD-10-CM

## 2024-01-25 LAB
ANION GAP SERPL CALCULATED.3IONS-SCNC: 11 MMOL/L (ref 7–15)
BUN SERPL-MCNC: 16.6 MG/DL (ref 6–20)
CALCIUM SERPL-MCNC: 10 MG/DL (ref 8.6–10)
CHLORIDE SERPL-SCNC: 98 MMOL/L (ref 98–107)
CHOLEST SERPL-MCNC: 134 MG/DL
CREAT SERPL-MCNC: 0.73 MG/DL (ref 0.51–0.95)
CREAT UR-MCNC: 241.3 MG/DL
DEPRECATED HCO3 PLAS-SCNC: 29 MMOL/L (ref 22–29)
EGFRCR SERPLBLD CKD-EPI 2021: >90 ML/MIN/1.73M2
FASTING STATUS PATIENT QL REPORTED: NO
GLUCOSE SERPL-MCNC: 125 MG/DL (ref 70–99)
HBA1C MFR BLD: 7.6 % (ref 4–6.2)
HDLC SERPL-MCNC: 51 MG/DL
LDLC SERPL CALC-MCNC: 53 MG/DL
MICROALBUMIN UR-MCNC: 14.5 MG/L
MICROALBUMIN/CREAT UR: 6.01 MG/G CR (ref 0–25)
NONHDLC SERPL-MCNC: 83 MG/DL
POTASSIUM SERPL-SCNC: 4.3 MMOL/L (ref 3.4–5.3)
SODIUM SERPL-SCNC: 138 MMOL/L (ref 135–145)
TRIGL SERPL-MCNC: 149 MG/DL

## 2024-01-25 PROCEDURE — 99214 OFFICE O/P EST MOD 30 MIN: CPT | Performed by: FAMILY MEDICINE

## 2024-01-25 PROCEDURE — 80061 LIPID PANEL: CPT | Mod: ZL | Performed by: FAMILY MEDICINE

## 2024-01-25 PROCEDURE — 36415 COLL VENOUS BLD VENIPUNCTURE: CPT | Mod: ZL | Performed by: FAMILY MEDICINE

## 2024-01-25 PROCEDURE — 82570 ASSAY OF URINE CREATININE: CPT | Mod: ZL | Performed by: FAMILY MEDICINE

## 2024-01-25 PROCEDURE — 83036 HEMOGLOBIN GLYCOSYLATED A1C: CPT | Mod: ZL | Performed by: FAMILY MEDICINE

## 2024-01-25 PROCEDURE — 80048 BASIC METABOLIC PNL TOTAL CA: CPT | Mod: ZL | Performed by: FAMILY MEDICINE

## 2024-01-25 RX ORDER — INSULIN GLARGINE 100 [IU]/ML
INJECTION, SOLUTION SUBCUTANEOUS
Qty: 60 ML | Refills: 4 | Status: SHIPPED | OUTPATIENT
Start: 2024-01-25

## 2024-01-25 ASSESSMENT — ANXIETY QUESTIONNAIRES
2. NOT BEING ABLE TO STOP OR CONTROL WORRYING: NOT AT ALL
8. IF YOU CHECKED OFF ANY PROBLEMS, HOW DIFFICULT HAVE THESE MADE IT FOR YOU TO DO YOUR WORK, TAKE CARE OF THINGS AT HOME, OR GET ALONG WITH OTHER PEOPLE?: SOMEWHAT DIFFICULT
5. BEING SO RESTLESS THAT IT IS HARD TO SIT STILL: NOT AT ALL
4. TROUBLE RELAXING: NOT AT ALL
GAD7 TOTAL SCORE: 1
3. WORRYING TOO MUCH ABOUT DIFFERENT THINGS: NOT AT ALL
GAD7 TOTAL SCORE: 1
7. FEELING AFRAID AS IF SOMETHING AWFUL MIGHT HAPPEN: NOT AT ALL
IF YOU CHECKED OFF ANY PROBLEMS ON THIS QUESTIONNAIRE, HOW DIFFICULT HAVE THESE PROBLEMS MADE IT FOR YOU TO DO YOUR WORK, TAKE CARE OF THINGS AT HOME, OR GET ALONG WITH OTHER PEOPLE: SOMEWHAT DIFFICULT
1. FEELING NERVOUS, ANXIOUS, OR ON EDGE: NOT AT ALL
7. FEELING AFRAID AS IF SOMETHING AWFUL MIGHT HAPPEN: NOT AT ALL
6. BECOMING EASILY ANNOYED OR IRRITABLE: SEVERAL DAYS

## 2024-01-25 ASSESSMENT — PAIN SCALES - GENERAL: PAINLEVEL: NO PAIN (0)

## 2024-01-25 NOTE — NURSING NOTE
"Chief Complaint   Patient presents with    Diabetes       Initial /68   Pulse 91   Temp 97.2  F (36.2  C) (Tympanic)   Resp 20   Ht 1.613 m (5' 3.5\")   Wt 71.7 kg (158 lb 2 oz)   LMP 10/09/2023 (Exact Date)   SpO2 100%   BMI 27.57 kg/m   Estimated body mass index is 27.57 kg/m  as calculated from the following:    Height as of this encounter: 1.613 m (5' 3.5\").    Weight as of this encounter: 71.7 kg (158 lb 2 oz).  Medication Review: complete    The next two questions are to help us understand your food security.  If you are feeling you need any assistance in this area, we have resources available to support you today.          1/25/2024   SDOH- Food Insecurity   Within the past 12 months, did you worry that your food would run out before you got money to buy more? N   Within the past 12 months, did the food you bought just not last and you didn t have money to get more? N         Health Care Directive:  Patient does not have a Health Care Directive or Living Will: Discussed advance care planning with patient; however, patient declined at this time.    Libby Ford LPN    Previous A1C is at goal of <8  Lab Results   Component Value Date    A1C 7.3 04/12/2023    A1C 9.9 11/10/2022    A1C 8.4 11/30/2021    A1C 8.2 02/05/2021    A1C 7.2 07/31/2020    A1C 8.7 06/20/2019    A1C 7.6 09/21/2018    A1C 9.1 04/11/2018     Urine microalbumin:creatine: due  Foot exam due  Eye exam yesterday 1/24/24    Tobacco User no  Patient is on a daily aspirin  Patient is on a Statin.  Blood pressure today of:     BP Readings from Last 1 Encounters:   01/25/24 106/68      is at the goal of <139/89 for diabetics.    Libby Ford LPN on 1/25/2024 at 11:08 AM          "

## 2024-01-25 NOTE — PROGRESS NOTES
Assessment & Plan     1. Type 2 diabetes mellitus without complication, with long-term current use of insulin (H)  Chronic, doing well on Ozempic (newest addition).  Continues to utilize Lantus daily (down to 20-40u depending on trends), and Metformin 500mg BID.    Monitoring labs due; discussed if further changes need to be made, would consider increase in Ozempic to 2mg weekly.  Discussed further reductions of her Lantus at that point.  Had Eye exam yesterday.  - Hemoglobin A1c; Future  - Lipid Panel; Future  - Basic Metabolic Panel; Future  - Albumin Random Urine Quantitative with Creat Ratio  - insulin glargine (LANTUS SOLOSTAR) 100 UNIT/ML pen; ADMINISTER 40 UNITS UNDER THE SKIN AT BEDTIME  Dispense: 60 mL; Refill: 4  - metFORMIN (GLUCOPHAGE) 500 MG tablet; TAKE 1 TABLET BY MOUTH TWICE DAILY WITH MEALS  Dispense: 180 tablet; Refill: 4  - Hemoglobin A1c  - Lipid Panel  - Basic Metabolic Panel    2. Visit for screening mammogram  Will order to be completed.  - MA Screen Bilateral w/James; Future    3. Colon cancer screening  Has collection kit at home; will notify me if she needs it re-ordered.    4. Hypercholesteremia  Chronic, on statin (simvastatin 20mg daily); tolerating well.  Monitoring labs today.  Otherwise refilled x 1 year.  - Lipid Panel; Future  - Lipid Panel    5. Needs flu shot  Believes she had at work this year; will check into that and receive at nurse only/pharmacy if needed.    6. Need for shingles vaccine  Declines at this time; will continue to consider.  Aware she can receive at the pharmacy.      MDM:  Review of the result(s) of each unique test - see [facility] result notes  Ordering of each unique test  Prescription drug management    Follow up in 3-4 months (DM check).      Rogelio Walters is a 53 year old, presenting for the following health issues:  Diabetes        1/25/2024    11:02 AM   Additional Questions   Roomed by ANIKA Souza   Accompanied by self         History of Present  "Illness       Diabetes:   She presents for follow up of diabetes.  She is checking home blood glucose two times daily.   She checks blood glucose before meals and at bedtime.  Blood glucose is sometimes over 200 and never under 70. She is aware of hypoglycemia symptoms including shakiness, dizziness and weakness.    She has no concerns regarding her diabetes at this time.   She is not experiencing numbness or burning in feet, excessive thirst, blurry vision, weight changes or redness, sores or blisters on feet. The patient has had a diabetic eye exam in the last 12 months. Eye exam performed on 2024. Location of last eye exam [facility].        She eats 0-1 servings of fruits and vegetables daily.She consumes 0 sweetened beverage(s) daily.She exercises with enough effort to increase her heart rate 9 or less minutes per day.  She exercises with enough effort to increase her heart rate 3 or less days per week.   She is taking medications regularly.     On ACEI, ASA, statin. Last visit: 23; Last A1c (23 7.3%)    No GI side effects on Metformin.  Has cut down to 500mg BID since addition and increase of Ozempic.       BP Readings from Last 2 Encounters:   24 106/68   23 132/80     Hemoglobin A1C (%)   Date Value   2024 7.6 (H)   2023 7.3 (H)   2021 8.2 (H)   2020 7.2 (H)     LDL Cholesterol Calculated (mg/dL)   Date Value   2024 53   11/10/2022 73   2020 57   2019 40       PREVENTATIVE:  Pap: 20, neg co-testing.  Due: 25.  Mammo: 10/26/21, birads1.  DUE.  Dexa: None  Lung: NA/never smoker  Colon: Cologuard  (unable to be processed).  DUE.  Current order is .  Tdap 2/10/17, flu yearly, Shingrix DUE, PNA @ 65.  Covid x2 complete; candidate for booster.  RSV @ 60.        Objective    /68   Pulse 91   Temp 97.2  F (36.2  C) (Tympanic)   Resp 20   Ht 1.613 m (5' 3.5\")   Wt 71.7 kg (158 lb 2 oz)   LMP 10/09/2023 (Exact Date) "   SpO2 100%   BMI 27.57 kg/m    Body mass index is 27.57 kg/m .  Physical Exam   GENERAL: alert and no distress  NECK: no adenopathy, no asymmetry, masses, or scars  RESP: lungs clear to auscultation - no rales, rhonchi or wheezes  CV: regular rate and rhythm, normal S1 S2, no S3 or S4, no murmur, click or rub, no peripheral edema  ABDOMEN: soft, nontender, no hepatosplenomegaly, no masses and bowel sounds normal  MS: no gross musculoskeletal defects noted, no edema  SKIN: no suspicious lesions or rashes  PSYCH: mentation appears normal, affect normal/bright  Diabetic foot exam: normal DP and PT pulses, slight trophic changes at balls of feet B (middle) or ulcerative lesions, and normal sensory exam.  Mildly dry/cracked heels.    Results for orders placed or performed in visit on 01/25/24   Albumin Random Urine Quantitative with Creat Ratio     Status: None   Result Value Ref Range    Creatinine Urine mg/dL 241.3 mg/dL    Albumin Urine mg/L 14.5 mg/L    Albumin Urine mg/g Cr 6.01 0.00 - 25.00 mg/g Cr   Hemoglobin A1c     Status: Abnormal   Result Value Ref Range    Hemoglobin A1C 7.6 (H) 4.0 - 6.2 %   Lipid Panel     Status: None   Result Value Ref Range    Cholesterol 134 <200 mg/dL    Triglycerides 149 <150 mg/dL    Direct Measure HDL 51 >=50 mg/dL    LDL Cholesterol Calculated 53 <=100 mg/dL    Non HDL Cholesterol 83 <130 mg/dL    Patient Fasting > 8hrs? No     Narrative    Cholesterol  Desirable:  <200 mg/dL    Triglycerides  Normal:  Less than 150 mg/dL  Borderline High:  150-199 mg/dL  High:  200-499 mg/dL  Very High:  Greater than or equal to 500 mg/dL    Direct Measure HDL  Female:  Greater than or equal to 50 mg/dL   Male:  Greater than or equal to 40 mg/dL    LDL Cholesterol  Desirable:  <100mg/dL  Above Desirable:  100-129 mg/dL   Borderline High:  130-159 mg/dL   High:  160-189 mg/dL   Very High:  >= 190 mg/dL    Non HDL Cholesterol  Desirable:  130 mg/dL  Above Desirable:  130-159 mg/dL  Borderline  High:  160-189 mg/dL  High:  190-219 mg/dL  Very High:  Greater than or equal to 220 mg/dL   Basic Metabolic Panel     Status: Abnormal   Result Value Ref Range    Sodium 138 135 - 145 mmol/L    Potassium 4.3 3.4 - 5.3 mmol/L    Chloride 98 98 - 107 mmol/L    Carbon Dioxide (CO2) 29 22 - 29 mmol/L    Anion Gap 11 7 - 15 mmol/L    Urea Nitrogen 16.6 6.0 - 20.0 mg/dL    Creatinine 0.73 0.51 - 0.95 mg/dL    GFR Estimate >90 >60 mL/min/1.73m2    Calcium 10.0 8.6 - 10.0 mg/dL    Glucose 125 (H) 70 - 99 mg/dL           Signed Electronically by: Irma Torres DO

## 2024-02-29 ENCOUNTER — HOSPITAL ENCOUNTER (OUTPATIENT)
Dept: MAMMOGRAPHY | Facility: OTHER | Age: 54
Discharge: HOME | End: 2024-02-29
Attending: FAMILY MEDICINE | Admitting: FAMILY MEDICINE
Payer: COMMERCIAL

## 2024-02-29 DIAGNOSIS — Z12.31 VISIT FOR SCREENING MAMMOGRAM: ICD-10-CM

## 2024-02-29 PROCEDURE — 77067 SCR MAMMO BI INCL CAD: CPT

## 2024-04-20 DIAGNOSIS — E11.9 TYPE 2 DIABETES MELLITUS WITHOUT COMPLICATION, WITH LONG-TERM CURRENT USE OF INSULIN (H): ICD-10-CM

## 2024-04-20 DIAGNOSIS — Z79.4 TYPE 2 DIABETES MELLITUS WITHOUT COMPLICATION, WITH LONG-TERM CURRENT USE OF INSULIN (H): ICD-10-CM

## 2024-04-25 RX ORDER — BLOOD SUGAR DIAGNOSTIC
STRIP MISCELLANEOUS
Qty: 300 STRIP | Refills: 1 | Status: SHIPPED | OUTPATIENT
Start: 2024-04-25

## 2024-04-25 NOTE — TELEPHONE ENCOUNTER
"[facility] sent Rx request for the following:      Requested Prescriptions   Pending Prescriptions Disp Refills    blood glucose (ACCU-CHEK SIERRA PLUS) test strip [Pharmacy Med Name: ACCU-CHEK SIERRA PLUS STRIPS 100S] 300 strip 1     Sig: USE TO CHECK BLOOD SUGAR THREE TIMES DAILY.       Diabetic Supplies Protocol Passed - 4/25/2024  2:16 PM        Passed - Medication is active on med list        Passed - Medication indicated for associated diagnosis        Passed - Patient is 18 years of age or older        Passed - Recent (6 mo) or future (30 days) visit within the authorizing provider's specialty     Patient had office visit in the last 6 months or has a visit in the next 30 days with authorizing provider.  See \"Patient Info\" tab in inbasket, or \"Choose Columns\" in Meds & Orders section of the refill encounter.                 Last Prescription Date:   6/14/23  Last Fill Qty/Refills:         300, R-1    Last Office Visit:              1/25/24   Future Office visit:           none    Prescription approved per [facility] Refill Protocol.    HOMA MACIEL RN on 4/25/2024 at 2:17 PM        "

## 2024-06-05 DIAGNOSIS — R45.86 LABILE MOOD: ICD-10-CM

## 2024-06-05 DIAGNOSIS — N95.1 PERIMENOPAUSAL: ICD-10-CM

## 2024-06-07 RX ORDER — VENLAFAXINE HYDROCHLORIDE 37.5 MG/1
CAPSULE, EXTENDED RELEASE ORAL
Qty: 90 CAPSULE | Refills: 0 | Status: SHIPPED | OUTPATIENT
Start: 2024-06-07

## 2024-06-07 NOTE — TELEPHONE ENCOUNTER
[facility] sent Rx request for the following:      Requested Prescriptions   Pending Prescriptions Disp Refills    venlafaxine (EFFEXOR XR) 37.5 MG 24 hr capsule [Pharmacy Med Name: VENLAFAXINE ER 37.5MG CAPSULES] 90 capsule 4     Sig: TAKE 1 CAPSULE(37.5 MG) BY MOUTH DAILY       Serotonin-Norepinephrine Reuptake Inhibitors  Passed - 6/7/2024  9:11 AM     Last Prescription Date:   6/7/23  Last Fill Qty/Refills:         90, R-4    Last Office Visit:              1/25/24   Future Office visit:            none     Brandy San RN on 6/7/2024 at 9:12 AM

## 2024-10-11 DIAGNOSIS — Z79.4 TYPE 2 DIABETES MELLITUS WITHOUT COMPLICATION, WITH LONG-TERM CURRENT USE OF INSULIN (H): ICD-10-CM

## 2024-10-11 DIAGNOSIS — E11.9 TYPE 2 DIABETES MELLITUS WITHOUT COMPLICATION, WITH LONG-TERM CURRENT USE OF INSULIN (H): ICD-10-CM

## 2024-10-11 DIAGNOSIS — E78.00 HYPERCHOLESTEREMIA: ICD-10-CM

## 2024-10-16 RX ORDER — SIMVASTATIN 20 MG
TABLET ORAL
Qty: 90 TABLET | Refills: 0 | Status: SHIPPED | OUTPATIENT
Start: 2024-10-16

## 2024-10-16 NOTE — TELEPHONE ENCOUNTER
[facility] sent Rx request for the following:      Requested Prescriptions   Pending Prescriptions Disp Refills    simvastatin (ZOCOR) 20 MG tablet [Pharmacy Med Name: SIMVASTATIN 20MG TABLETS] 90 tablet 3     Sig: TAKE 1 TABLET(20 MG) BY MOUTH AT BEDTIME       Antihyperlipidemic agents Passed - 10/16/2024  2:55 PM       Last Prescription Date:   9/12/23  Last Fill Qty/Refills:         90, R-3    Last Office Visit:              1/25/24   Future Office visit:                Prescription approved per [facility] Refill Protocol.  Cari Tarango RN on 10/16/2024 at 2:57 PM

## 2024-10-17 DIAGNOSIS — R45.86 LABILE MOOD: ICD-10-CM

## 2024-10-17 DIAGNOSIS — N95.1 PERIMENOPAUSAL: ICD-10-CM

## 2024-10-22 RX ORDER — VENLAFAXINE HYDROCHLORIDE 37.5 MG/1
CAPSULE, EXTENDED RELEASE ORAL
Qty: 90 CAPSULE | Refills: 4 | Status: SHIPPED | OUTPATIENT
Start: 2024-10-22

## 2024-10-22 NOTE — TELEPHONE ENCOUNTER
[facility] sent Rx request for the following:      Requested Prescriptions   Pending Prescriptions Disp Refills    venlafaxine (EFFEXOR XR) 37.5 MG 24 hr capsule [Pharmacy Med Name: VENLAFAXINE ER 37.5MG CAPSULES] 90 capsule 0     Sig: TAKE 1 CAPSULE(37.5 MG) BY MOUTH DAILY       Serotonin-Norepinephrine Reuptake Inhibitors  Failed - 10/17/2024  8:06 AM        Failed - Medication indicated for associated diagnosis     Medication is associated with one or more of the following diagnoses:  Anxiety  Bipolar  Chronic musculoskeletal pain  Depression  Fibromyalgia  Headache  Migraine  Neuropathy  Obsessive compulsive disorder  Panic disorder  Social phobia  Mood disorder  Menopause  Hot flashes/Menopausal flushing  Fibromyitis         Last Prescription Date:   6/7/24  Last Fill Qty/Refills:         90, R-0    Last Office Visit:              11/10/22   Future Office visit:           none    Routing refill request to provider for review/approval because:  Drug not on the [facility] refill protocol     Veronica Valdez RN on 10/22/2024 at 3:03 PM

## 2024-10-29 ENCOUNTER — HOSPITAL ENCOUNTER (OUTPATIENT)
Dept: GENERAL RADIOLOGY | Facility: OTHER | Age: 54
Discharge: HOME | End: 2024-10-29
Attending: NURSE PRACTITIONER
Payer: COMMERCIAL

## 2024-10-29 ENCOUNTER — OFFICE VISIT (OUTPATIENT)
Dept: FAMILY MEDICINE | Facility: OTHER | Age: 54
End: 2024-10-29
Attending: NURSE PRACTITIONER
Payer: COMMERCIAL

## 2024-10-29 VITALS
HEART RATE: 81 BPM | HEIGHT: 64 IN | RESPIRATION RATE: 18 BRPM | TEMPERATURE: 98 F | SYSTOLIC BLOOD PRESSURE: 108 MMHG | BODY MASS INDEX: 27.55 KG/M2 | OXYGEN SATURATION: 96 % | DIASTOLIC BLOOD PRESSURE: 71 MMHG | WEIGHT: 161.4 LBS

## 2024-10-29 DIAGNOSIS — J34.89 SINUS PRESSURE: ICD-10-CM

## 2024-10-29 DIAGNOSIS — R05.3 PERSISTENT COUGH: Primary | ICD-10-CM

## 2024-10-29 DIAGNOSIS — R50.9 INTERMITTENT FEVER: ICD-10-CM

## 2024-10-29 DIAGNOSIS — H66.003 NON-RECURRENT ACUTE SUPPURATIVE OTITIS MEDIA OF BOTH EARS WITHOUT SPONTANEOUS RUPTURE OF TYMPANIC MEMBRANES: ICD-10-CM

## 2024-10-29 DIAGNOSIS — H93.8X1 PLUGGED FEELING IN EAR, RIGHT: ICD-10-CM

## 2024-10-29 DIAGNOSIS — J01.10 ACUTE NON-RECURRENT FRONTAL SINUSITIS: ICD-10-CM

## 2024-10-29 PROCEDURE — 99213 OFFICE O/P EST LOW 20 MIN: CPT | Performed by: NURSE PRACTITIONER

## 2024-10-29 PROCEDURE — 71046 X-RAY EXAM CHEST 2 VIEWS: CPT

## 2024-10-29 ASSESSMENT — PAIN SCALES - GENERAL: PAINLEVEL_OUTOF10: NO PAIN (0)

## 2024-10-29 NOTE — NURSING NOTE
"Chief Complaint   Patient presents with    Cough    Fever   Patient presents to the rapid clinic today for concerns of a cough and fever. Patient states these symptoms have been going on for 2 weeks.     Initial /71 (BP Location: Left arm, Patient Position: Sitting, Cuff Size: Adult Regular)   Pulse 81   Temp 98  F (36.7  C) (Temporal)   Resp 18   Ht 1.613 m (5' 3.5\")   Wt 73.2 kg (161 lb 6.4 oz)   LMP 10/09/2023 (Exact Date)   SpO2 96%   BMI 28.14 kg/m   Estimated body mass index is 28.14 kg/m  as calculated from the following:    Height as of this encounter: 1.613 m (5' 3.5\").    Weight as of this encounter: 73.2 kg (161 lb 6.4 oz).  Medication Review: complete    The next two questions are to help us understand your food security.  If you are feeling you need any assistance in this area, we have resources available to support you today.          10/29/2024   SDOH- Food Insecurity   Within the past 12 months, did you worry that your food would run out before you got money to buy more? N   Within the past 12 months, did the food you bought just not last and you didn t have money to get more? N              Health Care Directive:  Patient does not have a Health Care Directive: Discussed advance care planning with patient; however, patient declined at this time.    Mik Mcghee      "

## 2024-10-29 NOTE — PROGRESS NOTES
ASSESSMENT/PLAN:     I have reviewed the nursing notes.  I have reviewed the findings, diagnosis, plan and need for follow up with the patient.        1. Intermittent fever  - XR Chest 2 Views    2. Persistent cough (Primary)  - XR Chest 2 Views    CXR completed and personally reviewed, no appreciated infiltrate, radiologist over read:  Clear chest    Discussed with patient that symptoms and exam are consistent with viral illness.    No clinical indications for antibiotic treatment at this time.    Symptomatic treatment:  Encouraged fluids, salt water gargles, honey, elevation, humidifier, saline nasal spray, sinus rinse/netti pot, lozenges, tea, soup, smoothies, popsicles, topical vapor rub, rest, etc   May use over the counter cough/cold medication PRN  Discussed warning signs/symptoms indicative of need to f/u  Follow up if symptoms persist or worsen or concerns    3. Sinus pressure  4. Acute non-recurrent frontal sinusitis  - amoxicillin-clavulanate (AUGMENTIN) 875-125 MG tablet; Take 1 tablet by mouth 2 times daily for 7 days.  Dispense: 14 tablet; Refill: 0    Symptomatic treatment - Encouraged fluids, salt water gargles, honey, elevation, humidifier, saline nasal spray, sinus rinse/netti pot, lozenges, tea, soup, smoothies, popsicles, topical vapor rub, rest, etc   May use over the counter sinus/cold medication PRN  May use over-the-counter Tylenol or ibuprofen PRN    5. Plugged feeling in ear, right  6. Non-recurrent acute suppurative otitis media of both ears without spontaneous rupture of tympanic membranes  - amoxicillin-clavulanate (AUGMENTIN) 875-125 MG tablet; Take 1 tablet by mouth 2 times daily for 7 days.  Dispense: 14 tablet; Refill: 0                    I explained my diagnostic considerations and recommendations to the patient, who voiced understanding and agreement with the treatment plan. All questions were answered. We discussed potential side effects of any prescribed or recommended  therapies, as well as expectations for response to treatments.    Joelle Combs NP  [facility]      SUBJECTIVE:   Selena Bingham is a 54 year old female who presents to clinic today for the following health issues:  Cough and fever    HPI  Cough for the past 2 weeks.  Cough is productive of variable clear and yellow phlegm.  Intermittent sensation of difficulty getting enough air in lungs, yesterday was bothersome with chest heaviness.   No shortness of breath with activity.  Fevers initially for 2 days with chills and sweats, no known fevers since.  Sinus pressure headaches with persisting nasal congestion and drainage.  No sore throat.  Occasional mild tickle in throat, resolves with water.  No nausea or vomiting.  Appetite at baseline.  Energy decreased some.   No OTC medications          Past Medical History:   Diagnosis Date    Type 2 diabetes mellitus without complications (H)     No Comments Provided    Urinary tract infection     UTI 12/8/12    Varicella without complication     Chickenpox     Past Surgical History:   Procedure Laterality Date    OTHER SURGICAL HISTORY      [id number],NO PREVIOUS SURGERY     Social History     Tobacco Use    Smoking status: Never    Smokeless tobacco: Never   Substance Use Topics    Alcohol use: Yes     Alcohol/week: 0.0 standard drinks of alcohol     Comment: occasional     Current Outpatient Medications   Medication Sig Dispense Refill    aspirin (ASA) 81 MG tablet Take by mouth daily 30 tablet     B-D U/F insulin pen needle USE ONCE DAILY 100 each 4    blood glucose (ACCU-CHEK SIERRA PLUS) test strip USE TO CHECK BLOOD SUGAR THREE TIMES DAILY. 300 strip 1    blood glucose (NO BRAND SPECIFIED) lancets standard Use to test blood sugar 3 times daily or as directed.Dispense item as covered by patient's insurance 300 each 4    blood glucose monitoring (NO BRAND SPECIFIED) meter device kit Use to test blood sugar 3 times daily or as directed.Dispense item as  "covered by patient's insurance 1 kit 0    Calcium Carb-Cholecalciferol (CALCIUM 600 + D) 600-200 MG-UNIT TABS Take 1 tablet by mouth daily      insulin glargine (LANTUS SOLOSTAR) 100 UNIT/ML pen ADMINISTER 40 UNITS UNDER THE SKIN AT BEDTIME 60 mL 4    lisinopril (ZESTRIL) 2.5 MG tablet TAKE 2 TABLETS(5 MG) BY MOUTH DAILY 180 tablet 4    metFORMIN (GLUCOPHAGE) 500 MG tablet TAKE 1 TABLET BY MOUTH TWICE DAILY WITH MEALS 180 tablet 4    Multiple Vitamin (MULTI-VITAMINS) TABS Take 1 tablet by mouth daily      Semaglutide, 2 MG/DOSE, (OZEMPIC) 8 MG/3ML pen Inject 2 mg Subcutaneous every 7 days 9 mL 4    simvastatin (ZOCOR) 20 MG tablet TAKE 1 TABLET(20 MG) BY MOUTH AT BEDTIME 90 tablet 0    venlafaxine (EFFEXOR XR) 37.5 MG 24 hr capsule TAKE 1 CAPSULE(37.5 MG) BY MOUTH DAILY 90 capsule 4    OZEMPIC, 1 MG/DOSE, 4 MG/3ML pen Inject 1 mg Subcutaneous every 7 days 9 mL 4     No Known Allergies      Past medical history, past surgical history, current medications and allergies reviewed and accurate to the best of my knowledge.        OBJECTIVE:     /71 (BP Location: Left arm, Patient Position: Sitting, Cuff Size: Adult Regular)   Pulse 81   Temp 98  F (36.7  C) (Temporal)   Resp 18   Ht 1.613 m (5' 3.5\")   Wt 73.2 kg (161 lb 6.4 oz)   LMP 10/09/2023 (Exact Date)   SpO2 96%   BMI 28.14 kg/m    Body mass index is 28.14 kg/m .        Physical Exam  General Appearance: Well appearing adult female, appropriate appearance for age. No acute distress  Ears: Left TM intact, no visible bony landmarks, no erythema, complete dull purulent effusion with bulging.  Right TM intact, no visible bony landmarks, no erythema,  complete dull purulent effusion with bulging.  Left auditory canal clear without drainage or bleeding.  Right auditory canal clear without drainage or bleeding.  Normal external ears, non tender.  Eyes: conjunctivae normal without erythema or irritation, corneas clear, no drainage or crusting, no eyelid " swelling, pupils equal   Orophayrnx: moist mucous membranes, pharynx without erythema, tonsils without hypertrophy, tonsils without erythema, no tonsillar exudates, no oral lesions, no palate petechiae, no post nasal drip seen, no trismus, voice clear.    Sinuses:  Frontal sinus tenderness.  Non tender maxillary sinuses  Nose:  right nares with erythema, narrowing and congestion.  Left nares without erythema, edema, or congestion  Neck: supple without adenopathy  Respiratory: normal chest wall and respirations.  Normal effort.  Clear to auscultation bilaterally, no wheezing, crackles or rhonchi.  No increased work of breathing.  No cough appreciated.  Cardiac: RRR with no murmurs  Musculoskeletal:  Equal movement of bilateral upper extremities.  Equal movement of bilateral lower extremities.  Normal gait.    Psychological: normal affect, alert, oriented, and pleasant.     Imaging:  Results for orders placed or performed in visit on 10/29/24   XR Chest 2 Views     Status: None    Narrative    XR CHEST 2 VIEWS    HISTORY: 54 years Female Persistent cough; Intermittent fever    COMPARISON: 6/25/2018    TECHNIQUE: 2 views of the chest were obtained.    FINDINGS: Two views of the chest were obtained. Heart size and  pulmonary vascularity are within normal limits, lungs are clear on  both views. No consolidating air space opacities are present.          Impression    IMPRESSION: Clear chest.    ROXANNE POWELL MD         SYSTEM ID:  [id number]

## 2024-11-30 ENCOUNTER — HEALTH MAINTENANCE LETTER (OUTPATIENT)
Age: 54
End: 2024-11-30

## 2024-12-10 DIAGNOSIS — E11.9 TYPE 2 DIABETES MELLITUS WITHOUT COMPLICATION, WITH LONG-TERM CURRENT USE OF INSULIN (H): ICD-10-CM

## 2024-12-10 DIAGNOSIS — Z79.4 TYPE 2 DIABETES MELLITUS WITHOUT COMPLICATION, WITH LONG-TERM CURRENT USE OF INSULIN (H): ICD-10-CM

## 2024-12-10 RX ORDER — LISINOPRIL 2.5 MG/1
TABLET ORAL
Qty: 180 TABLET | Refills: 4 | Status: SHIPPED | OUTPATIENT
Start: 2024-12-10

## 2024-12-10 NOTE — TELEPHONE ENCOUNTER
[facility] sent Rx request for the following:      Requested Prescriptions   Pending Prescriptions Disp Refills    lisinopril (ZESTRIL) 2.5 MG tablet [Pharmacy Med Name: LISINOPRIL 2.5MG TABLETS] 180 tablet 4     Sig: TAKE 2 TABLETS(5 MG) BY MOUTH DAILY     Last Prescription Date:   12/06/2024  Last Fill Qty/Refills:         180, R-4    Last Office Visit:              10/11/2022   Future Office visit:            01/22/2025    Prescription approved per [facility] Refill Protocol.   Lisbet Patterson RN on 12/10/2024 at 3:38 PM

## 2025-01-17 ASSESSMENT — ANXIETY QUESTIONNAIRES
8. IF YOU CHECKED OFF ANY PROBLEMS, HOW DIFFICULT HAVE THESE MADE IT FOR YOU TO DO YOUR WORK, TAKE CARE OF THINGS AT HOME, OR GET ALONG WITH OTHER PEOPLE?: NOT DIFFICULT AT ALL
GAD7 TOTAL SCORE: 0
7. FEELING AFRAID AS IF SOMETHING AWFUL MIGHT HAPPEN: NOT AT ALL
2. NOT BEING ABLE TO STOP OR CONTROL WORRYING: NOT AT ALL
6. BECOMING EASILY ANNOYED OR IRRITABLE: NOT AT ALL
5. BEING SO RESTLESS THAT IT IS HARD TO SIT STILL: NOT AT ALL
4. TROUBLE RELAXING: NOT AT ALL
GAD7 TOTAL SCORE: 0
7. FEELING AFRAID AS IF SOMETHING AWFUL MIGHT HAPPEN: NOT AT ALL
IF YOU CHECKED OFF ANY PROBLEMS ON THIS QUESTIONNAIRE, HOW DIFFICULT HAVE THESE PROBLEMS MADE IT FOR YOU TO DO YOUR WORK, TAKE CARE OF THINGS AT HOME, OR GET ALONG WITH OTHER PEOPLE: NOT DIFFICULT AT ALL
GAD7 TOTAL SCORE: 0
1. FEELING NERVOUS, ANXIOUS, OR ON EDGE: NOT AT ALL
3. WORRYING TOO MUCH ABOUT DIFFERENT THINGS: NOT AT ALL

## 2025-01-21 NOTE — PROGRESS NOTES
"  Assessment & Plan     ***    MDM:  {MDM 2021 Documentation (Optional):075869}  {2021 E&M time (Optional):710322}    BMI  Estimated body mass index is 28.84 kg/m  as calculated from the following:    Height as of this encounter: 1.613 m (5' 3.5\").    Weight as of this encounter: 75 kg (165 lb 6 oz).   {Weight Management Plan needed for ACO:430583}      Follow up: ***      Rogelio Walters is a 54 year old, presenting for the following health issues:  Diabetes        1/22/2025     2:15 PM   Additional Questions   Roomed by ANIKA Souza   Accompanied by self     History of Present Illness       Diabetes:   She presents for follow up of diabetes.  She is checking home blood glucose two times daily.   She checks blood glucose before meals and at bedtime.  Blood glucose is sometimes over 200 and never under 70. She is aware of hypoglycemia symptoms including shakiness, dizziness and weakness.    She has no concerns regarding her diabetes at this time.   She is not experiencing numbness or burning in feet, excessive thirst, blurry vision, weight changes or redness, sores or blisters on feet. The patient has not had a diabetic eye exam in the last 12 months.          She eats 0-1 servings of fruits and vegetables daily.She consumes 1 sweetened beverage(s) daily.She exercises with enough effort to increase her heart rate 9 or less minutes per day.  She exercises with enough effort to increase her heart rate 3 or less days per week.   She is taking medications regularly.         Objective    /74   Pulse 97   Temp 97.2  F (36.2  C) (Tympanic)   Resp 16   Ht 1.613 m (5' 3.5\")   Wt 75 kg (165 lb 6 oz)   LMP 10/09/2023 (Exact Date)   SpO2 100%   BMI 28.84 kg/m    Body mass index is 28.84 kg/m .    Physical Exam   {Brief/partially selected :699887}    No results found for any visits on 01/22/25.        Signed Electronically by: Irma Torres DO  {Email feedback regarding this note to " [email or web address]   :[id number]}

## 2025-01-22 ENCOUNTER — OFFICE VISIT (OUTPATIENT)
Dept: FAMILY MEDICINE | Facility: OTHER | Age: 55
End: 2025-01-22
Attending: FAMILY MEDICINE
Payer: COMMERCIAL

## 2025-01-22 VITALS
SYSTOLIC BLOOD PRESSURE: 132 MMHG | WEIGHT: 165.38 LBS | DIASTOLIC BLOOD PRESSURE: 74 MMHG | TEMPERATURE: 97.2 F | HEIGHT: 64 IN | OXYGEN SATURATION: 100 % | BODY MASS INDEX: 28.24 KG/M2 | RESPIRATION RATE: 16 BRPM | HEART RATE: 97 BPM

## 2025-01-22 DIAGNOSIS — N95.1 PERIMENOPAUSAL: ICD-10-CM

## 2025-01-22 DIAGNOSIS — Z79.4 TYPE 2 DIABETES MELLITUS WITHOUT COMPLICATION, WITH LONG-TERM CURRENT USE OF INSULIN (H): Primary | ICD-10-CM

## 2025-01-22 DIAGNOSIS — E78.00 HYPERCHOLESTEREMIA: ICD-10-CM

## 2025-01-22 DIAGNOSIS — R45.86 LABILE MOOD: ICD-10-CM

## 2025-01-22 DIAGNOSIS — E11.9 TYPE 2 DIABETES MELLITUS WITHOUT COMPLICATION, WITH LONG-TERM CURRENT USE OF INSULIN (H): Primary | ICD-10-CM

## 2025-01-22 DIAGNOSIS — Z78.0 MENOPAUSE: ICD-10-CM

## 2025-01-22 LAB
ANION GAP SERPL CALCULATED.3IONS-SCNC: 9 MMOL/L (ref 7–15)
BUN SERPL-MCNC: 12.8 MG/DL (ref 6–20)
CALCIUM SERPL-MCNC: 10.4 MG/DL (ref 8.8–10.4)
CHLORIDE SERPL-SCNC: 100 MMOL/L (ref 98–107)
CHOLEST SERPL-MCNC: 162 MG/DL
CREAT SERPL-MCNC: 0.79 MG/DL (ref 0.51–0.95)
CREAT UR-MCNC: 82.7 MG/DL
EGFRCR SERPLBLD CKD-EPI 2021: 88 ML/MIN/1.73M2
EST. AVERAGE GLUCOSE BLD GHB EST-MCNC: 157 MG/DL
ESTRADIOL SERPL-MCNC: 6 PG/ML
FASTING STATUS PATIENT QL REPORTED: NO
FASTING STATUS PATIENT QL REPORTED: NO
FSH SERPL IRP2-ACNC: 53.6 MIU/ML
GLUCOSE SERPL-MCNC: 84 MG/DL (ref 70–99)
HBA1C MFR BLD: 7.1 %
HCO3 SERPL-SCNC: 30 MMOL/L (ref 22–29)
HDLC SERPL-MCNC: 80 MG/DL
LDLC SERPL CALC-MCNC: 64 MG/DL
MICROALBUMIN UR-MCNC: <12 MG/L
MICROALBUMIN/CREAT UR: NORMAL MG/G{CREAT}
NONHDLC SERPL-MCNC: 82 MG/DL
POTASSIUM SERPL-SCNC: 3.8 MMOL/L (ref 3.4–5.3)
SODIUM SERPL-SCNC: 139 MMOL/L (ref 135–145)
TRIGL SERPL-MCNC: 88 MG/DL

## 2025-01-22 PROCEDURE — 83001 ASSAY OF GONADOTROPIN (FSH): CPT | Mod: ZL | Performed by: FAMILY MEDICINE

## 2025-01-22 PROCEDURE — 84478 ASSAY OF TRIGLYCERIDES: CPT | Mod: ZL | Performed by: FAMILY MEDICINE

## 2025-01-22 PROCEDURE — 82570 ASSAY OF URINE CREATININE: CPT | Mod: ZL | Performed by: FAMILY MEDICINE

## 2025-01-22 PROCEDURE — 82670 ASSAY OF TOTAL ESTRADIOL: CPT | Mod: ZL | Performed by: FAMILY MEDICINE

## 2025-01-22 PROCEDURE — 82374 ASSAY BLOOD CARBON DIOXIDE: CPT | Mod: ZL | Performed by: FAMILY MEDICINE

## 2025-01-22 PROCEDURE — 80048 BASIC METABOLIC PNL TOTAL CA: CPT | Mod: ZL | Performed by: FAMILY MEDICINE

## 2025-01-22 PROCEDURE — 83718 ASSAY OF LIPOPROTEIN: CPT | Mod: ZL | Performed by: FAMILY MEDICINE

## 2025-01-22 PROCEDURE — 83036 HEMOGLOBIN GLYCOSYLATED A1C: CPT | Mod: ZL | Performed by: FAMILY MEDICINE

## 2025-01-22 PROCEDURE — 36415 COLL VENOUS BLD VENIPUNCTURE: CPT | Mod: ZL | Performed by: FAMILY MEDICINE

## 2025-01-22 RX ORDER — SIMVASTATIN 20 MG
20 TABLET ORAL AT BEDTIME
Qty: 90 TABLET | Refills: 4 | Status: SHIPPED | OUTPATIENT
Start: 2025-01-22

## 2025-01-22 RX ORDER — VENLAFAXINE HYDROCHLORIDE 75 MG/1
75 CAPSULE, EXTENDED RELEASE ORAL DAILY
Qty: 90 CAPSULE | Refills: 4 | Status: SHIPPED | OUTPATIENT
Start: 2025-01-22

## 2025-01-22 RX ORDER — INSULIN GLARGINE 100 [IU]/ML
30 INJECTION, SOLUTION SUBCUTANEOUS AT BEDTIME
Qty: 30 ML | Refills: 4 | Status: SHIPPED | OUTPATIENT
Start: 2025-01-22

## 2025-01-22 ASSESSMENT — PAIN SCALES - GENERAL: PAINLEVEL_OUTOF10: NO PAIN (0)

## 2025-01-22 NOTE — NURSING NOTE
"Chief Complaint   Patient presents with    Diabetes       Initial /74   Pulse 97   Temp 97.2  F (36.2  C) (Tympanic)   Resp 16   Ht 1.613 m (5' 3.5\")   Wt 75 kg (165 lb 6 oz)   LMP 10/09/2023 (Exact Date)   SpO2 100%   BMI 28.84 kg/m   Estimated body mass index is 28.84 kg/m  as calculated from the following:    Height as of this encounter: 1.613 m (5' 3.5\").    Weight as of this encounter: 75 kg (165 lb 6 oz).  Medication Review: complete    The next two questions are to help us understand your food security.  If you are feeling you need any assistance in this area, we have resources available to support you today.          10/29/2024   SDOH- Food Insecurity   Within the past 12 months, did you worry that your food would run out before you got money to buy more? N   Within the past 12 months, did the food you bought just not last and you didn t have money to get more? N         Health Care Directive:  Patient does not have a Health Care Directive: Discussed advance care planning with patient; however, patient declined at this time.    Libby Ford LPN      "

## 2025-02-28 DIAGNOSIS — E11.9 TYPE 2 DIABETES MELLITUS WITHOUT COMPLICATION, WITH LONG-TERM CURRENT USE OF INSULIN (H): ICD-10-CM

## 2025-02-28 DIAGNOSIS — Z79.4 TYPE 2 DIABETES MELLITUS WITHOUT COMPLICATION, WITH LONG-TERM CURRENT USE OF INSULIN (H): ICD-10-CM

## 2025-03-04 RX ORDER — BLOOD SUGAR DIAGNOSTIC
STRIP MISCELLANEOUS
Qty: 300 STRIP | Refills: 1 | Status: SHIPPED | OUTPATIENT
Start: 2025-03-04

## 2025-03-04 NOTE — TELEPHONE ENCOUNTER
[facility] sent Rx request for the following:      Requested Prescriptions   Pending Prescriptions Disp Refills    ACCU-CHEK SIERRA PLUS test strip [Pharmacy Med Name: ACCU-CHEK SIERRA PLUS TEST STRIP 50S] 300 strip 1     Sig: USE TO CHECK BLOOD SUGAR THREE TIMES DAILY.       Diabetic Supplies Protocol Passed - 3/4/2025 11:09 AM        Passed - Medication is active on med list and the sig matches. RN to manually verify dose and sig if red X/fail.     If the protocol passes (green check), you do not need to verify med dose and sig.    A prescription matches if they are the same clinical intention.    For Example: once daily and every morning are the same.    For all fails (red x), verify dose and sig.    If the refill does match what is on file, the RN can still proceed to approve the refill request.     If they do not match, route to the appropriate provider.             Passed - Recent (12 month) or future (90 days) visit with authorizing provider s specialty     The patient must have completed an in-person or virtual visit within the past 12 months or has a future visit scheduled within the next 90 days with the authorizing provider s specialty.  Urgent care and e-visits do not qualify as an office visit for this protocol.          Passed - Medication indicated for associated diagnosis        Passed - Patient is 18 years of age or older             Last Prescription Date:   4/25/24  Last Fill Qty/Refills:         300, R-1    Last Office Visit:              1/22/25   Future Office visit:           none    Prescription approved per [facility] Refill Protocol.  Veronica Valdez RN on 3/4/2025 at 11:10 AM

## 2025-06-02 DIAGNOSIS — Z79.4 TYPE 2 DIABETES MELLITUS WITHOUT COMPLICATION, WITH LONG-TERM CURRENT USE OF INSULIN (H): ICD-10-CM

## 2025-06-02 DIAGNOSIS — E11.9 TYPE 2 DIABETES MELLITUS WITHOUT COMPLICATION, WITH LONG-TERM CURRENT USE OF INSULIN (H): ICD-10-CM

## 2025-06-04 RX ORDER — SEMAGLUTIDE 2.68 MG/ML
INJECTION, SOLUTION SUBCUTANEOUS
Qty: 9 ML | Refills: 0 | Status: SHIPPED | OUTPATIENT
Start: 2025-06-04

## 2025-06-04 NOTE — TELEPHONE ENCOUNTER
[facility] sent Rx request for the following:      Requested Prescriptions   Pending Prescriptions Disp Refills    OZEMPIC (2 MG/DOSE) 8 MG/3ML pen [Pharmacy Med Name: OZEMPIC 2MG PER DOSE (8MG/3ML) PFP] 9 mL 0     Sig: INJECT 2MG UNDER THE SKIN ONCE A WEEK       GLP-1 Agonists Protocol Failed - 6/4/2025  4:08 PM   Last Prescription Date:   02/28/2025  Last Fill Qty/Refills:         9 ml, R-0    Last Office Visit:              01/22/2025   Future Office visit:           None  Unable to complete prescription refill per RN Medication Refill Policy.    Lisbet Patterson RN on 6/4/2025 at 4:11 PM

## 2025-08-09 ENCOUNTER — HEALTH MAINTENANCE LETTER (OUTPATIENT)
Age: 55
End: 2025-08-09